# Patient Record
Sex: FEMALE | Race: WHITE | NOT HISPANIC OR LATINO | Employment: FULL TIME | URBAN - METROPOLITAN AREA
[De-identification: names, ages, dates, MRNs, and addresses within clinical notes are randomized per-mention and may not be internally consistent; named-entity substitution may affect disease eponyms.]

---

## 2015-03-24 LAB
HPV_EXT - HISTORICAL: NORMAL
PAP SMEAR - HIM PATIENT REPORTED: NORMAL

## 2017-03-03 ENCOUNTER — COMMUNICATION - HEALTHEAST (OUTPATIENT)
Dept: INTERNAL MEDICINE | Facility: CLINIC | Age: 37
End: 2017-03-03

## 2017-03-03 DIAGNOSIS — F32.A DEPRESSIVE DISORDER: ICD-10-CM

## 2017-03-03 DIAGNOSIS — F41.1 ANXIETY STATE: ICD-10-CM

## 2017-03-08 ENCOUNTER — COMMUNICATION - HEALTHEAST (OUTPATIENT)
Dept: INTERNAL MEDICINE | Facility: CLINIC | Age: 37
End: 2017-03-08

## 2017-03-08 DIAGNOSIS — F41.1 ANXIETY STATE: ICD-10-CM

## 2017-03-08 DIAGNOSIS — F32.A DEPRESSIVE DISORDER: ICD-10-CM

## 2017-07-12 ENCOUNTER — OFFICE VISIT - HEALTHEAST (OUTPATIENT)
Dept: INTERNAL MEDICINE | Facility: CLINIC | Age: 37
End: 2017-07-12

## 2017-07-12 DIAGNOSIS — Z00.00 ROUTINE GENERAL MEDICAL EXAMINATION AT A HEALTH CARE FACILITY: ICD-10-CM

## 2017-07-12 DIAGNOSIS — F32.A DEPRESSIVE DISORDER: ICD-10-CM

## 2017-07-12 DIAGNOSIS — F41.1 ANXIETY STATE: ICD-10-CM

## 2017-07-12 DIAGNOSIS — R06.02 SOB (SHORTNESS OF BREATH): ICD-10-CM

## 2017-07-12 ASSESSMENT — MIFFLIN-ST. JEOR: SCORE: 1421.32

## 2017-08-14 ENCOUNTER — OFFICE VISIT - HEALTHEAST (OUTPATIENT)
Dept: INTERNAL MEDICINE | Facility: CLINIC | Age: 37
End: 2017-08-14

## 2017-08-14 DIAGNOSIS — F32.89 DEPRESSIVE DISORDER, NOT ELSEWHERE CLASSIFIED: ICD-10-CM

## 2017-08-14 ASSESSMENT — MIFFLIN-ST. JEOR: SCORE: 1430.39

## 2017-09-06 ENCOUNTER — COMMUNICATION - HEALTHEAST (OUTPATIENT)
Dept: INTERNAL MEDICINE | Facility: CLINIC | Age: 37
End: 2017-09-06

## 2018-02-08 ENCOUNTER — COMMUNICATION - HEALTHEAST (OUTPATIENT)
Dept: INTERNAL MEDICINE | Facility: CLINIC | Age: 38
End: 2018-02-08

## 2018-02-26 ENCOUNTER — AMBULATORY - HEALTHEAST (OUTPATIENT)
Dept: NURSING | Facility: CLINIC | Age: 38
End: 2018-02-26

## 2018-03-13 ENCOUNTER — RECORDS - HEALTHEAST (OUTPATIENT)
Dept: ADMINISTRATIVE | Facility: OTHER | Age: 38
End: 2018-03-13

## 2018-06-20 ENCOUNTER — COMMUNICATION - HEALTHEAST (OUTPATIENT)
Dept: INTERNAL MEDICINE | Facility: CLINIC | Age: 38
End: 2018-06-20

## 2018-07-19 ENCOUNTER — OFFICE VISIT - HEALTHEAST (OUTPATIENT)
Dept: INTERNAL MEDICINE | Facility: CLINIC | Age: 38
End: 2018-07-19

## 2018-07-19 DIAGNOSIS — N92.0 MENORRHAGIA: ICD-10-CM

## 2018-07-19 DIAGNOSIS — F32.9 MAJOR DEPRESSIVE DISORDER: ICD-10-CM

## 2018-07-19 DIAGNOSIS — Z00.00 ROUTINE GENERAL MEDICAL EXAMINATION AT A HEALTH CARE FACILITY: ICD-10-CM

## 2018-07-19 ASSESSMENT — MIFFLIN-ST. JEOR: SCORE: 1325.49

## 2018-08-06 ENCOUNTER — AMBULATORY - HEALTHEAST (OUTPATIENT)
Dept: LAB | Facility: CLINIC | Age: 38
End: 2018-08-06

## 2018-08-06 DIAGNOSIS — N92.0 MENORRHAGIA: ICD-10-CM

## 2018-08-06 LAB
ERYTHROCYTE [DISTWIDTH] IN BLOOD BY AUTOMATED COUNT: 11.2 % (ref 11–14.5)
FERRITIN SERPL-MCNC: 68 NG/ML (ref 10–130)
HCT VFR BLD AUTO: 43.9 % (ref 35–47)
HGB BLD-MCNC: 14.5 G/DL (ref 12–16)
IRON SATN MFR SERPL: 61 % (ref 20–50)
IRON SERPL-MCNC: 141 UG/DL (ref 42–175)
MCH RBC QN AUTO: 30.6 PG (ref 27–34)
MCHC RBC AUTO-ENTMCNC: 33.1 G/DL (ref 32–36)
MCV RBC AUTO: 92 FL (ref 80–100)
PLATELET # BLD AUTO: 154 THOU/UL (ref 140–440)
PMV BLD AUTO: 8.6 FL (ref 7–10)
RBC # BLD AUTO: 4.75 MILL/UL (ref 3.8–5.4)
TIBC SERPL-MCNC: 231 UG/DL (ref 313–563)
TRANSFERRIN SERPL-MCNC: 185 MG/DL (ref 212–360)
WBC: 4.9 THOU/UL (ref 4–11)

## 2019-01-21 ENCOUNTER — COMMUNICATION - HEALTHEAST (OUTPATIENT)
Dept: INTERNAL MEDICINE | Facility: CLINIC | Age: 39
End: 2019-01-21

## 2019-01-22 ENCOUNTER — COMMUNICATION - HEALTHEAST (OUTPATIENT)
Dept: INTERNAL MEDICINE | Facility: CLINIC | Age: 39
End: 2019-01-22

## 2019-02-25 ENCOUNTER — COMMUNICATION - HEALTHEAST (OUTPATIENT)
Dept: INTERNAL MEDICINE | Facility: CLINIC | Age: 39
End: 2019-02-25

## 2019-03-05 ENCOUNTER — COMMUNICATION - HEALTHEAST (OUTPATIENT)
Dept: INTERNAL MEDICINE | Facility: CLINIC | Age: 39
End: 2019-03-05

## 2019-03-28 ENCOUNTER — OFFICE VISIT - HEALTHEAST (OUTPATIENT)
Dept: INTERNAL MEDICINE | Facility: CLINIC | Age: 39
End: 2019-03-28

## 2019-03-28 DIAGNOSIS — F41.1 ANXIETY STATE: ICD-10-CM

## 2019-04-07 ENCOUNTER — RECORDS - HEALTHEAST (OUTPATIENT)
Dept: ADMINISTRATIVE | Facility: OTHER | Age: 39
End: 2019-04-07

## 2019-04-09 ENCOUNTER — COMMUNICATION - HEALTHEAST (OUTPATIENT)
Dept: INTERNAL MEDICINE | Facility: CLINIC | Age: 39
End: 2019-04-09

## 2019-04-13 ENCOUNTER — RECORDS - HEALTHEAST (OUTPATIENT)
Dept: ADMINISTRATIVE | Facility: OTHER | Age: 39
End: 2019-04-13

## 2019-04-15 ENCOUNTER — COMMUNICATION - HEALTHEAST (OUTPATIENT)
Dept: INTERNAL MEDICINE | Facility: CLINIC | Age: 39
End: 2019-04-15

## 2019-04-23 ENCOUNTER — COMMUNICATION - HEALTHEAST (OUTPATIENT)
Dept: INTERNAL MEDICINE | Facility: CLINIC | Age: 39
End: 2019-04-23

## 2019-04-23 ENCOUNTER — AMBULATORY - HEALTHEAST (OUTPATIENT)
Dept: INTERNAL MEDICINE | Facility: CLINIC | Age: 39
End: 2019-04-23

## 2019-04-23 ENCOUNTER — OFFICE VISIT - HEALTHEAST (OUTPATIENT)
Dept: INTERNAL MEDICINE | Facility: CLINIC | Age: 39
End: 2019-04-23

## 2019-04-23 DIAGNOSIS — F41.1 ANXIETY STATE: ICD-10-CM

## 2019-04-30 ENCOUNTER — COMMUNICATION - HEALTHEAST (OUTPATIENT)
Dept: INTERNAL MEDICINE | Facility: CLINIC | Age: 39
End: 2019-04-30

## 2019-06-12 ENCOUNTER — COMMUNICATION - HEALTHEAST (OUTPATIENT)
Dept: INTERNAL MEDICINE | Facility: CLINIC | Age: 39
End: 2019-06-12

## 2019-07-09 ENCOUNTER — OFFICE VISIT - HEALTHEAST (OUTPATIENT)
Dept: INTERNAL MEDICINE | Facility: CLINIC | Age: 39
End: 2019-07-09

## 2019-08-19 ENCOUNTER — HOSPITAL ENCOUNTER (OUTPATIENT)
Dept: MAMMOGRAPHY | Facility: CLINIC | Age: 39
Discharge: HOME | End: 2019-08-19
Attending: INTERNAL MEDICINE

## 2019-08-19 DIAGNOSIS — Z12.31 SCREENING MAMMOGRAM, ENCOUNTER FOR: ICD-10-CM

## 2019-08-29 ENCOUNTER — RECORDS - HEALTHEAST (OUTPATIENT)
Dept: HEALTH INFORMATION MANAGEMENT | Facility: CLINIC | Age: 39
End: 2019-08-29

## 2019-10-11 ENCOUNTER — COMMUNICATION - HEALTHEAST (OUTPATIENT)
Dept: INTERNAL MEDICINE | Facility: CLINIC | Age: 39
End: 2019-10-11

## 2019-10-11 DIAGNOSIS — F41.1 ANXIETY STATE: ICD-10-CM

## 2019-12-03 ENCOUNTER — RECORDS - HEALTHEAST (OUTPATIENT)
Dept: ADMINISTRATIVE | Facility: OTHER | Age: 39
End: 2019-12-03

## 2020-01-15 ENCOUNTER — COMMUNICATION - HEALTHEAST (OUTPATIENT)
Dept: INTERNAL MEDICINE | Facility: CLINIC | Age: 40
End: 2020-01-15

## 2020-01-15 DIAGNOSIS — F41.1 ANXIETY STATE: ICD-10-CM

## 2020-03-03 ENCOUNTER — AMBULATORY - HEALTHEAST (OUTPATIENT)
Dept: NURSING | Facility: CLINIC | Age: 40
End: 2020-03-03

## 2020-04-02 ENCOUNTER — COMMUNICATION - HEALTHEAST (OUTPATIENT)
Dept: INTERNAL MEDICINE | Facility: CLINIC | Age: 40
End: 2020-04-02

## 2020-04-02 DIAGNOSIS — F41.1 ANXIETY STATE: ICD-10-CM

## 2020-09-02 ENCOUNTER — HOSPITAL ENCOUNTER (OUTPATIENT)
Dept: MAMMOGRAPHY | Facility: CLINIC | Age: 40
Discharge: HOME | End: 2020-09-02
Attending: OBSTETRICS & GYNECOLOGY

## 2020-09-02 DIAGNOSIS — Z12.31 VISIT FOR SCREENING MAMMOGRAM: ICD-10-CM

## 2021-04-13 ENCOUNTER — COMMUNICATION - HEALTHEAST (OUTPATIENT)
Dept: INTERNAL MEDICINE | Facility: CLINIC | Age: 41
End: 2021-04-13

## 2021-04-13 DIAGNOSIS — F41.1 ANXIETY STATE: ICD-10-CM

## 2021-04-13 RX ORDER — PAROXETINE 20 MG/1
TABLET, FILM COATED ORAL
Qty: 90 TABLET | Refills: 0 | Status: SHIPPED | OUTPATIENT
Start: 2021-04-13 | End: 2021-07-10

## 2021-04-22 ENCOUNTER — COMMUNICATION - HEALTHEAST (OUTPATIENT)
Dept: INTERNAL MEDICINE | Facility: CLINIC | Age: 41
End: 2021-04-22

## 2021-04-22 DIAGNOSIS — F41.1 ANXIETY STATE: ICD-10-CM

## 2021-04-28 ENCOUNTER — AMBULATORY - HEALTHEAST (OUTPATIENT)
Dept: NURSING | Facility: CLINIC | Age: 41
End: 2021-04-28

## 2021-05-03 ENCOUNTER — OFFICE VISIT - HEALTHEAST (OUTPATIENT)
Dept: INTERNAL MEDICINE | Facility: CLINIC | Age: 41
End: 2021-05-03

## 2021-05-03 DIAGNOSIS — M25.511 RIGHT SHOULDER PAIN, UNSPECIFIED CHRONICITY: ICD-10-CM

## 2021-05-03 DIAGNOSIS — Z00.00 ROUTINE GENERAL MEDICAL EXAMINATION AT A HEALTH CARE FACILITY: ICD-10-CM

## 2021-05-03 LAB
CHOLEST SERPL-MCNC: 148 MG/DL
FASTING STATUS PATIENT QL REPORTED: NO
FASTING STATUS PATIENT QL REPORTED: NO
GLUCOSE BLD-MCNC: 83 MG/DL (ref 70–125)
HDLC SERPL-MCNC: 54 MG/DL
LDLC SERPL CALC-MCNC: 81 MG/DL
TRIGL SERPL-MCNC: 67 MG/DL

## 2021-05-03 ASSESSMENT — MIFFLIN-ST. JEOR: SCORE: 1361.78

## 2021-05-19 ENCOUNTER — AMBULATORY - HEALTHEAST (OUTPATIENT)
Dept: NURSING | Facility: CLINIC | Age: 41
End: 2021-05-19

## 2021-05-27 VITALS
HEART RATE: 76 BPM | WEIGHT: 146 LBS | SYSTOLIC BLOOD PRESSURE: 98 MMHG | DIASTOLIC BLOOD PRESSURE: 60 MMHG | HEIGHT: 68 IN | BODY MASS INDEX: 22.13 KG/M2

## 2021-05-27 NOTE — PROGRESS NOTES
Dionne comes in today for follow-up of her anxiety.  She is currently taking 40 mg of Prozac which she has been on for a number of years.  Approximately 1-2 years ago we tried to switch her to Effexor but she did not tolerate this well at all and in fact had some suicidal thoughts while on it.  Unfortunately over the last few months she sitting panic symptoms and has had to take her Ativan more frequently to the point where she is taking almost daily.  She will get some shortness of breath as well as a sensation that the room is closing in on her.  A lot of the visit was spent in counseling today empathizing with her feelings.  She is inquiring about cytochrome P4 50 testing and whether it would be worthwhile to do so.  I told her that this could potentially give us some good information regarding her medications and I would support her if she chose to do it.  She is otherwise feeling well.    Objective: Vital signs are as per the EMR.  In general the patient is alert pleasant and in no acute distress.  She appears healthy.    Assessment and plan:    1.  Anxiety, uncontrolled.  I told her that I would favor adding BuSpar to her regimen rather than changing medications given her previous experience with changing medications.  She is going to look up more info on BuSpar and get back to me if she wants to start it.  She is very interested in genetic testing which apparently is something that can be ordered through a lab company independently.  Again, she is going to get back to me if she wants to change her medication regimen.    40 minutes were spent with the patient, over half of which was in counseling coordination of care.

## 2021-05-27 NOTE — TELEPHONE ENCOUNTER
Left detailed message for patient that mychart message was received and to give a couple days for a response.  Анна Moreno CMA ............... 4:22 PM, 04/15/19

## 2021-05-27 NOTE — TELEPHONE ENCOUNTER
Who is calling:  Patient   Reason for Call:  Calling for follow up request to My Chart messages. Patient states it is not of urgency but would like to confirm delivery .  Date of last appointment with primary care: 03/28/19  Okay to leave a detailed message: Yes

## 2021-05-28 NOTE — PROGRESS NOTES
"Sangita Bowling is a 38 y.o. female who is being evaluated via a billable telephone visit.      The patient has been notified of following:     \"This telephone visit will be conducted via a call between you and your physician/provider. We have found that certain health care needs can be provided without the need for a physical exam.  This service lets us provide the care you need with a short phone conversation.  If a prescription is necessary we can send it directly to your pharmacy.  If lab work is needed we can place an order for that and you can then stop by our lab to have the test done at a later time.    If during the course of the call the physician/provider feels a telephone visit is not appropriate, you will not be charged for this service.\"     Consent has been obtained for this service by 1 care team member: Yes. See the scanned image in the medical record.    Sangita Bowling complains of Follow-up (new medication )  .    I have reviewed and updated the patient's Past Medical History, Social History, Family History and Medication List.    ALLERGIES  Patient has no known allergies.    Petra Maurice (MA signature)    Additional provider notes:Depression or Anxiety - New Diagnosis   Duration of complaint: Years   Abnormal Mood Symptoms      Duration: Years    Description:  Depression: no  Anxiety: yes  Panic attacks: yes     Accompanying signs and symptoms: see PHQ-9 and DEWEY scores    History (similar episodes/previous evaluation): Dionne has been on Prozac for a number of years but feels that it is not working well for her.  She has been having some more panic attacks.  She had genetic testing which is scanned into the chart.  She is a moderate metabolizer of Prozac but has minimal gene drug interactions with both Paxil and BuSpar.    Precipitating or alleviating factors: None    Therapies tried and outcome: Effexor XR (Venafaxine), significant side effects      Assessment/Plan:  Anxiety.  Given her " genetic testing I am going to start her on Paxil at 20 mg/day.  Side effects were reviewed.  We will have another virtual visit in approximately 3 to 4 weeks.  If her symptoms are not well controlled at that time while we may increase her Paxil or start her on BuSpar as well.    Follow Up Plan: Follow up in 4 week(s) for follow-up.  I have reviewed the note as documented above.  This accurately captures the substance of my conversation with the patient.    Total time of call between patient and provider was 15 minutes     Petra Maurice MA /BIA

## 2021-05-28 NOTE — TELEPHONE ENCOUNTER
Who is calling:  The patient  Reason for Call:  The patient would like to know if starting on a lower dose of Paxil than what was originally dicussed at virtual visit would be of would be effective.   Date of last appointment with primary care: 4/23/2019  Okay to leave a detailed message: No

## 2021-05-28 NOTE — TELEPHONE ENCOUNTER
Yes, I am going to be starting her on a relatively low dose of Paxil to make sure she tolerates it well

## 2021-05-28 NOTE — TELEPHONE ENCOUNTER
Left voicemail for patient to return call to clinic. When patient returns call, please give them below message.    Анна Moreno CMA ............... 2:36 PM, 04/23/19

## 2021-05-28 NOTE — TELEPHONE ENCOUNTER
Patient Returning Call  Reason for call:  Medication question   Information relayed to patient:  Yes, I am going to be starting her on a relatively low dose of Paxil to make sure she tolerates it well.  Patient has additional questions:  Patient verbalized understanding and had no additional questions. She was advised to call the clinic if she experiences any adverse effects of this medication.   If YES, what are your questions/concerns:  N/A  Okay to leave a detailed message?: No call back needed

## 2021-05-31 VITALS — WEIGHT: 160 LBS | BODY MASS INDEX: 24.25 KG/M2 | HEIGHT: 68 IN

## 2021-05-31 VITALS — HEIGHT: 68 IN | WEIGHT: 162 LBS | BODY MASS INDEX: 24.55 KG/M2

## 2021-06-01 VITALS — BODY MASS INDEX: 20.92 KG/M2 | WEIGHT: 138 LBS | HEIGHT: 68 IN

## 2021-06-02 ENCOUNTER — RECORDS - HEALTHEAST (OUTPATIENT)
Dept: ADMINISTRATIVE | Facility: CLINIC | Age: 41
End: 2021-06-02

## 2021-06-02 VITALS — BODY MASS INDEX: 20.37 KG/M2 | WEIGHT: 133 LBS

## 2021-06-02 NOTE — TELEPHONE ENCOUNTER
Refill Approved    Rx renewed per Medication Renewal Policy. Medication was last renewed on 4/23/2019 with 1 refill.  Last office visit: 3/28/2019 with PCP Dr ANDRAE Rangel, [facility] Triage/Med Refill 10/12/2019     Requested Prescriptions   Pending Prescriptions Disp Refills     PARoxetine (PAXIL) 20 MG tablet [Pharmacy Med Name: PAROXETINE 20MG     TAB] 90 tablet 1     Sig: TAKE 1 TABLET BY MOUTH ONCE DAILY IN THE MORNING       [facility] Refill Protocol  Passed - 10/11/2019  9:42 AM        Passed - PCP or prescribing provider visit in last year     Last office visit with prescriber/PCP: 3/28/2019 Armando Lowe MD OR same dept: 3/28/2019 Armando Lowe MD OR same specialty: 3/28/2019 Armando Lowe MD  Last physical: 7/19/2018 Last MTM visit: Visit date not found   Next visit within 3 mo: Visit date not found  Next physical within 3 mo: Visit date not found  Prescriber OR PCP: Armando Lowe MD  Last diagnosis associated with med order: 1. Anxiety  - PARoxetine (PAXIL) 20 MG tablet [Pharmacy Med Name: PAROXETINE 20MG     TAB]; TAKE 1 TABLET BY MOUTH ONCE DAILY IN THE MORNING  Dispense: 90 tablet; Refill: 1    If protocol passes may refill for 12 months if within 3 months of last provider visit (or a total of 15 months).

## 2021-06-05 NOTE — TELEPHONE ENCOUNTER
Refill Approved    Rx renewed per Medication Renewal Policy. Medication was last renewed on 10/12/19.    Dorothy Michele, [facility] Triage/Med Refill 1/17/2020     Requested Prescriptions   Pending Prescriptions Disp Refills     PARoxetine (PAXIL) 20 MG tablet [Pharmacy Med Name: PARoxetine HCl 20 MG Oral Tablet] 90 tablet 0     Sig: TAKE 1 TABLET BY MOUTH ONCE DAILY IN THE MORNING       [facility] Refill Protocol  Passed - 1/15/2020  6:54 PM        Passed - PCP or prescribing provider visit in last year     Last office visit with prescriber/PCP: 3/28/2019 Armando Lowe MD OR same dept: 3/28/2019 Armando Lowe MD OR same specialty: 3/28/2019 Armando Lowe MD  Last physical: 7/19/2018 Last MTM visit: Visit date not found   Next visit within 3 mo: Visit date not found  Next physical within 3 mo: Visit date not found  Prescriber OR PCP: Armando Lowe MD  Last diagnosis associated with med order: 1. Anxiety  - PARoxetine (PAXIL) 20 MG tablet [Pharmacy Med Name: PARoxetine HCl 20 MG Oral Tablet]; TAKE 1 TABLET BY MOUTH ONCE DAILY IN THE MORNING  Dispense: 90 tablet; Refill: 0    If protocol passes may refill for 12 months if within 3 months of last provider visit (or a total of 15 months).

## 2021-06-07 NOTE — TELEPHONE ENCOUNTER
RN cannot approve Refill Request    RN can NOT refill this medication Protocol failed and NO refill given.     Dorothy Michele, [facility] Triage/Med Refill 4/3/2020    Requested Prescriptions   Pending Prescriptions Disp Refills     PARoxetine (PAXIL) 20 MG tablet [Pharmacy Med Name: PARoxetine HCl 20 MG Oral Tablet] 90 tablet 3     Sig: TAKE 1 TABLET BY MOUTH ONCE DAILY IN THE MORNING       [facility] Refill Protocol  Failed - 4/2/2020  8:27 AM        Failed - PCP or prescribing provider visit in last year     Last office visit with prescriber/PCP: 3/28/2019 Armando Lowe MD OR same dept: Visit date not found OR same specialty: 3/28/2019 Armando Lowe MD  Last physical: 7/19/2018 Last MTM visit: Visit date not found   Next visit within 3 mo: Visit date not found  Next physical within 3 mo: Visit date not found  Prescriber OR PCP: Armando Lowe MD  Last diagnosis associated with med order: 1. Anxiety  - PARoxetine (PAXIL) 20 MG tablet [Pharmacy Med Name: PARoxetine HCl 20 MG Oral Tablet]; TAKE 1 TABLET BY MOUTH ONCE DAILY IN THE MORNING  Dispense: 90 tablet; Refill: 0    If protocol passes may refill for 12 months if within 3 months of last provider visit (or a total of 15 months).

## 2021-06-11 NOTE — PROGRESS NOTES
"Sangita comes in today for her yearly physical.  Please see the physical exam forms a and B for further details, including a complete review of systems.    ILNESSES, HOSPITALIZATIONS, AND OPERATIONS:     #1.  History of depression and anxiety, currently on Prozac.     #2.  Status post  ×2.    Sangita states that she has been more irritable over the last few months and she has not been sleeping as well.  She has been on Prozac for about the last 22 years and on the same dose for the majority of that time period.  Appetite has not changed and her ability to concentrate is the same.  Energy level is a bit decreased as well.  She also states that she is more short of breath recently, specifically while going upstairs.  However this is not significantly interfering with any of her day-to-day activities.  She states this is been an issue for the last couple of years and she states that she feels that she is not able to get a \"good deep breath\".  She denies any blood in her stool or black tarry stools.  No menorrhagia.    OBJECTIVE:    In general the patient is alert pleasant and in no acute distress.    HEENT: Pupils equal round reactive light.  Oropharynx is clear.  Lymphatic shows no anterior posterior cervical lymphadenopathy.  No thyromegaly or other masses noted.    Cardiovascular: S1-S2 regular in rhythm no murmurs gallops rubs    Lungs are clear    Abdomen is soft nontender nondistended.  No HSM.    Extremities show no pedal edema present bilaterally.  DP pulses are 2+ and normal bilaterally.    Assessment and plan: Physical exam along with the followin.  Depression, worsening.  I explained that people can adapt to the SSRIs, especially being on them for such a long period of time.  I recommended that we switch to an SNRI which she is okay with doing.  We will start her out on Effexor at 75 mg per day.  Side effects were reviewed.  She will follow-up in 1 month.  2.  Shortness of breath.  Check a CBC, " CMP, TSH, sed rate, and CRP.  I will call her with results of further recommendations.  3.  Healthcare maintenance.  Pap is up-to-date.  Vaccinations are up-to-date.  We will discuss mammograms next year.  Follow-up 1 year, earlier if needed.

## 2021-06-12 NOTE — PROGRESS NOTES
Adia comes in today for follow-up of her anxiety.  At her last visit about a month ago we started her on Effexor as she thought that her Prozac was not working as well for her.  Unfortunately she has had a number of side effects from the Effexor including hot flashes, jitteriness, and sweating.  She also states that it is not controlling her anxiety as much as the Prozac was.  She actually had to take 1 of her Ativan a few nights ago which is a very rare occurrence for her.  We discussed the pros and cons of switching to other medications versus coming off of medications completely today.    Objective: Vital signs are as per the EMR.  In general the patient is alert pleasant and in no acute distress.  She appears healthy.    Assessment and plan: Anxiety, a bit worsened since going on Effexor.  Again we discussed the pros and cons of her coming off of medication completely.  She would like to try this but she wants to wean off of her Effexor prior to doing so.  I sent her in a prescription for 14 days of 37.5 mg capsules.  She is going to wean off of the Effexor and then take 2-3 weeks for things to re-equilibrate.  If her anxiety worsens at that time and she wants a different medication she can send me a message or give me a call and we could consider either Celexa or Lexapro for her.  Otherwise follow-up as needed.    25 minutes were spent with the patient, over half of which was in counseling and coordination of care.

## 2021-06-16 NOTE — TELEPHONE ENCOUNTER
Controlled Substance Refill Request  Medication Name:   Requested Prescriptions     Pending Prescriptions Disp Refills     LORazepam (ATIVAN) 1 MG tablet [Pharmacy Med Name: LORAZEPAM 1MG TABS] 30 tablet 0     Sig: TAKE ONE TABLET BY MOUTH EVERY 6 HOURS AS NEEDED FOR ANXIETY     Date Last Fill: 1/22/19  [facility]ed [facility]  Submit electronically to pharmacy  Controlled Substance Agreement on file:   Encounter-Level CSA Scan Date:    There are no encounter-level csa scan date.        Last office visit:  7/9/19

## 2021-06-17 NOTE — PROGRESS NOTES
Assessment:      Healthy female exam.      Anxiety, well controlled.     Plan:     Check a lipid profile and glucose.  She gets her mammograms, Paps, and annual breast exams through her gynecologist (Dr. Kruse).  Vaccinations are up-to-date.  Follow-up 2 years, earlier if needed.    Subjective:      Sangita Bowling is a 40 y.o. female who presents for an annual exam.  She is feeling well today and has no acute complaints.  She has a history of anxiety which is well controlled on Paxil.  She is looking to get a referral for physical therapy for some low-level right shoulder pain today which was provided.      Immunization History   Administered Date(s) Administered     COVID-19,PF,Pfizer 2021     Hep A, historic 2010     INFLUENZA,SEASONAL QUAD, PF, =/> 6months 2020     Influenza, inj, historic,unspecified 2014, 10/12/2020     Influenza,seasonal quad, PF 2013     Influenza,seasonal,quad inj =/> 6months 10/08/2015, 2016, 2018     Td,adult,historic,unspecified 2009     Tdap 2009, 2015     Immunization status: up to date and documented.    No exam data present    OB History    Para Term  AB Living   3 2 2     1   SAB TAB Ectopic Multiple Live Births           1      # Outcome Date GA Lbr Roni/2nd Weight Sex Delivery Anes PTL Lv   3 Term 13   6 lb 4 oz (2.835 kg) M CS-LTranv  N SHARON   2 Term            1                Birth Comments: System Generated. Please review and update pregnancy details.       Current Outpatient Medications   Medication Sig Dispense Refill     L.acid/B.bifidum/B.animal/FOS (PROBIOTIC COMPLEX ORAL) Take by mouth.       PARoxetine (PAXIL) 20 MG tablet TAKE 1 TABLET BY MOUTH ONCE DAILY IN THE MORNING 90 tablet 0     No current facility-administered medications for this visit.      No past medical history on file.  Past Surgical History:   Procedure Laterality Date      SECTION, LOW TRANSVERSE       LASIK  Bilateral      IN  DELIVERY ONLY      Description:  Section;  Recorded: 2013;     IN FRAGMENT KIDNEY STONE/ ESWL      Description: Lithotripsy;  Recorded: 2009;  Comments: x2     Venlafaxine  Family History   Problem Relation Age of Onset     Hypertension Father      Alzheimer's disease Father      Hyperlipidemia Father      Stroke Father      Brain cancer Paternal Grandmother      Stroke Paternal Grandmother      Lung cancer Other      Social History     Socioeconomic History     Marital status:      Spouse name: Not on file     Number of children: Not on file     Years of education: Not on file     Highest education level: Not on file   Occupational History     Not on file   Social Needs     Financial resource strain: Not on file     Food insecurity     Worry: Not on file     Inability: Not on file     Transportation needs     Medical: Not on file     Non-medical: Not on file   Tobacco Use     Smoking status: Former Smoker     Smokeless tobacco: Never Used   Substance and Sexual Activity     Alcohol use: Yes     Alcohol/week: 1.0 standard drinks     Types: 1 Glasses of wine per week     Drug use: No     Sexual activity: Yes     Partners: Male     Birth control/protection: None   Lifestyle     Physical activity     Days per week: Not on file     Minutes per session: Not on file     Stress: Not on file   Relationships     Social connections     Talks on phone: Not on file     Gets together: Not on file     Attends Rastafari service: Not on file     Active member of club or organization: Not on file     Attends meetings of clubs or organizations: Not on file     Relationship status: Not on file     Intimate partner violence     Fear of current or ex partner: Not on file     Emotionally abused: Not on file     Physically abused: Not on file     Forced sexual activity: Not on file   Other Topics Concern     Not on file   Social History Narrative     Not on file       Review of  "Systems  General:  Denies problem  Eyes: Denies problem  Ears/Nose/Throat: Denies problem  Cardiovascular: Denies problem  Respiratory:  Denies problem  Gastrointestinal:  Denies problem, Genitourinary: Denies problem  Musculoskeletal:  Denies problem  Skin: Denies problem  Neurologic: Denies problem  Psychiatric: Denies problem  Endocrine: Denies problem  Heme/Lymphatic: Denies problem   Allergic/Immunologic: Denies problem        Objective:         Vitals:    05/03/21 1305   BP: 98/60   Pulse: 76   Weight: 146 lb (66.2 kg)   Height: 5' 7.75\" (1.721 m)     Body mass index is 22.36 kg/m .    Physical Exam:  General Appearance: Alert, cooperative, no distress, appears stated age  Head: Normocephalic, without obvious abnormality, atraumatic  Eyes: PERRL, conjunctiva/corneas clear, EOM's intact  Ears: Normal TM's and external ear canals, both ears  Nose: Nares normal, septum midline,mucosa normal, no drainage  Throat: Lips, mucosa, and tongue normal; teeth and gums normal  Neck: Supple, symmetrical, trachea midline, no adenopathy;  thyroid: not enlarged, symmetric, no tenderness/mass/nodules; no carotid bruit or JVD  Back: Symmetric, no curvature, ROM normal, no CVA tenderness  Lungs: Clear to auscultation bilaterally, respirations unlabored  Heart: Regular rate and rhythm, S1 and S2 normal, no murmur, rub, or gallop, Abdomen: Soft, non-tender, bowel sounds active all four quadrants,  no masses, no organomegaly  Extremities: Extremities normal, atraumatic, no cyanosis or edema  Skin: Skin color, texture, turgor normal, no rashes or lesions  Lymph nodes: Cervical, supraclavicular, and axillary nodes normal  Neurologic: Normal        "

## 2021-06-19 NOTE — PROGRESS NOTES
"Sangita comes in today for her yearly physical exam.  A complete ROS was undertaken and was negative unless noted below.    ILLNESSES, HOSPITALIZATIONS, AND OPERATIONS:    #1.  Depression, well controlled on Prozac.    Dionne feels well today and has no acute complaints.  She has been working hard on weight loss and has lost about 20 pounds since last time I saw her.  She states that she feels \"as good as I have ever felt\".  Things are going well with her kids, ages 3 and 5.  She does say that she has had fairly heavy periods since March of this year.  She has noticed that she gets more fatigued during menstruation.  She has not had this evaluated yet.    OBJECTIVE:    In general the patient is alert pleasant and in no acute distress.    HEENT: Pupils equal round reactive light.  Oropharynx is clear.  Lymphatic shows no anterior posterior cervical lymphadenopathy.  No thyromegaly or other masses noted.    Cardiovascular: S1-S2 regular in rhythm no murmurs gallops rubs    Lungs are clear    Abdomen is soft nontender nondistended.  No HSM.    Extremities show no pedal edema present bilaterally.  DP pulses are 2+ and normal bilaterally.    Assessment and plan:    1.  Menorrhagia.  Check a ITS, ferritin, and CBC.  We discussed treatment of this including thinking about OCPs and potentially referral to gynecology for consideration of an endometrial ablation.  2. Depression, well controlled.  3.  Healthcare maintenance.  Labs up-to-date, vaccinations up-to-date.  Follow-up 2 years, earlier if needed.  "

## 2021-06-20 ENCOUNTER — HEALTH MAINTENANCE LETTER (OUTPATIENT)
Age: 41
End: 2021-06-20

## 2021-06-23 NOTE — TELEPHONE ENCOUNTER
Medication Request  Medication name:   LORazepam (ATIVAN) 1 MG tablet (Discontinued) 30 tablet 0 8/4/2016 8/17/2016 No   Sig - Route: Take 1 tablet (1 mg total) by mouth every 8 (eight) hours as needed for anxiety. - Oral     Pharmacy Name and Location: [facility]  Reason for request: patient states she has been experiencing increased anxiety.  When did you use medication last?: Unknown  Okay to leave a detailed message: yes

## 2021-06-23 NOTE — TELEPHONE ENCOUNTER
Medication Question or Clarification  Who is calling: Patient  What medication are you calling about?: LORazepam (ATIVAN) 1 MG tablet  What dose do you take?: see below Rx  How often are you taking the medication?: see below Rx  Who prescribed the medication?: pcp  What is your question/concern?: Patient reports this medication is not available at the below pharmacy. Please send new prescription to [facility] in [place]. Pharmacy cannot transfer due to the medication being a controlled substance.  Pharmacy: [place] [id number] [place] [facility] [place] - [address] [place] to leave a detailed message?: Yes  Site CMT - Please call the pharmacy to obtain any additional needed information.    LORazepam (ATIVAN) 1 MG tablet   Medication   Date: 1/21/2019 Department: [facility] Internal Medicine Ordering/Authorizing: Aramndo Lowe MD   Order Providers     Prescribing Provider Encounter Provider   Armando Lowe MD Johnson, Greg Michael, MD   Medication Detail      Disp Refills Start End    LORazepam (ATIVAN) 1 MG tablet 30 tablet 0 1/21/2019     Sig - Route: Take 1 tablet (1 mg total) by mouth every 6 (six) hours as needed for anxiety. - Oral    Sent to pharmacy as: LORazepam (ATIVAN) 1 MG tablet    E-Prescribing Status: Receipt confirmed by pharmacy (1/21/2019 12:03 PM CST)    Pharmacy     [place] [id number] [place] [facility] [place] - [address]

## 2021-07-10 ENCOUNTER — COMMUNICATION - HEALTHEAST (OUTPATIENT)
Dept: INTERNAL MEDICINE | Facility: CLINIC | Age: 41
End: 2021-07-10

## 2021-07-10 DIAGNOSIS — F41.1 ANXIETY STATE: ICD-10-CM

## 2021-07-10 RX ORDER — PAROXETINE 20 MG/1
TABLET, FILM COATED ORAL
Qty: 90 TABLET | Refills: 3 | Status: SHIPPED | OUTPATIENT
Start: 2021-07-10 | End: 2022-07-21

## 2021-07-10 NOTE — TELEPHONE ENCOUNTER
Telephone Encounter by Liza Fu RN at 7/10/2021  9:32 AM     Author: Liza Fu RN Service: -- Author Type: Registered Nurse    Filed: 7/10/2021  9:34 AM Encounter Date: 7/10/2021 Status: Signed    : Liza Fu RN (Registered Nurse)       Refill Approved    Rx renewed per Medication Renewal Policy. Medication was last renewed on 4/13/21.    Liza Fu [facility] Triage/Med Refill 7/10/2021     Requested Prescriptions   Pending Prescriptions Disp Refills   ? PARoxetine (PAXIL) 20 MG tablet [Pharmacy Med Name: PARoxetine HCl 20 MG Oral Tablet] 90 tablet 0     Sig: TAKE 1 TABLET BY MOUTH ONCE DAILY IN THE MORNING       SSRI Refill Protocol  Passed - 7/10/2021  7:39 AM        Passed - PCP or prescribing provider visit in last year     Last office visit with prescriber/PCP: 3/28/2019 Armando Lowe MD OR same dept: Visit date not found OR same specialty: 3/28/2019 Armando Lowe MD  Last physical: 5/3/2021 Last MTM visit: Visit date not found   Next visit within 3 mo: Visit date not found  Next physical within 3 mo: Visit date not found  Prescriber OR PCP: Armando Lowe MD  Last diagnosis associated with med order: 1. Anxiety  - PARoxetine (PAXIL) 20 MG tablet [Pharmacy Med Name: PARoxetine HCl 20 MG Oral Tablet]; TAKE 1 TABLET BY MOUTH ONCE DAILY IN THE MORNING  Dispense: 90 tablet; Refill: 0    If protocol passes may refill for 12 months if within 3 months of last provider visit (or a total of 15 months).

## 2021-10-05 ENCOUNTER — HOSPITAL ENCOUNTER (OUTPATIENT)
Dept: MAMMOGRAPHY | Facility: CLINIC | Age: 41
Discharge: HOME | End: 2021-10-05
Attending: OBSTETRICS & GYNECOLOGY | Admitting: OBSTETRICS & GYNECOLOGY
Payer: COMMERCIAL

## 2021-10-05 DIAGNOSIS — Z12.31 VISIT FOR SCREENING MAMMOGRAM: ICD-10-CM

## 2021-10-05 PROCEDURE — 77067 SCR MAMMO BI INCL CAD: CPT

## 2021-10-11 ENCOUNTER — HEALTH MAINTENANCE LETTER (OUTPATIENT)
Age: 41
End: 2021-10-11

## 2022-07-17 ENCOUNTER — HEALTH MAINTENANCE LETTER (OUTPATIENT)
Age: 42
End: 2022-07-17

## 2022-07-20 DIAGNOSIS — F41.1 ANXIETY STATE: ICD-10-CM

## 2022-07-21 RX ORDER — PAROXETINE 20 MG/1
TABLET, FILM COATED ORAL
Qty: 90 TABLET | Refills: 0 | Status: SHIPPED | OUTPATIENT
Start: 2022-07-21 | End: 2022-09-16

## 2022-07-21 NOTE — TELEPHONE ENCOUNTER
"Routing refill request to provider for review/approval because:  Patient needs to be seen because it has been more than 1 year since last office visit.    Last Written Prescription Date:  7/10/21  Last Fill Quantity: 90,  # refills: 3   Last office visit provider:  5/3/21     Requested Prescriptions   Pending Prescriptions Disp Refills     PARoxetine (PAXIL) 20 MG tablet [Pharmacy Med Name: PARoxetine HCl 20 MG Oral Tablet] 90 tablet 0     Sig: TAKE 1 TABLET BY MOUTH ONCE DAILY IN THE MORNING       SSRIs Protocol Failed - 7/20/2022  2:28 PM        Failed - Recent (12 mo) or future (30 days) visit within the authorizing provider's specialty     Patient has had an office visit with the authorizing provider or a provider within the authorizing providers department within the previous 12 mos or has a future within next 30 days. See \"Patient Info\" tab in inbasket, or \"Choose Columns\" in Meds & Orders section of the refill encounter.              Passed - Medication is active on med list        Passed - Patient is age 18 or older        Passed - No active pregnancy on record        Passed - No positive pregnancy test in last 12 months             Eloina Watkins RN 07/21/22 1:15 AM  "

## 2022-07-28 ENCOUNTER — OFFICE VISIT (OUTPATIENT)
Dept: INTERNAL MEDICINE | Facility: CLINIC | Age: 42
End: 2022-07-28
Payer: COMMERCIAL

## 2022-07-28 VITALS
OXYGEN SATURATION: 98 % | DIASTOLIC BLOOD PRESSURE: 62 MMHG | SYSTOLIC BLOOD PRESSURE: 98 MMHG | WEIGHT: 147.6 LBS | HEART RATE: 85 BPM | BODY MASS INDEX: 22.61 KG/M2

## 2022-07-28 DIAGNOSIS — F41.1 ANXIETY STATE: Primary | ICD-10-CM

## 2022-07-28 PROCEDURE — 99214 OFFICE O/P EST MOD 30 MIN: CPT | Performed by: INTERNAL MEDICINE

## 2022-07-28 RX ORDER — LORAZEPAM 1 MG/1
1 TABLET ORAL EVERY 6 HOURS PRN
COMMUNITY
End: 2023-08-02

## 2022-07-28 ASSESSMENT — ANXIETY QUESTIONNAIRES
2. NOT BEING ABLE TO STOP OR CONTROL WORRYING: SEVERAL DAYS
5. BEING SO RESTLESS THAT IT IS HARD TO SIT STILL: NOT AT ALL
3. WORRYING TOO MUCH ABOUT DIFFERENT THINGS: SEVERAL DAYS
IF YOU CHECKED OFF ANY PROBLEMS ON THIS QUESTIONNAIRE, HOW DIFFICULT HAVE THESE PROBLEMS MADE IT FOR YOU TO DO YOUR WORK, TAKE CARE OF THINGS AT HOME, OR GET ALONG WITH OTHER PEOPLE: SOMEWHAT DIFFICULT
GAD7 TOTAL SCORE: 4
4. TROUBLE RELAXING: NOT AT ALL
1. FEELING NERVOUS, ANXIOUS, OR ON EDGE: SEVERAL DAYS
7. FEELING AFRAID AS IF SOMETHING AWFUL MIGHT HAPPEN: NOT AT ALL
GAD7 TOTAL SCORE: 4
GAD7 TOTAL SCORE: 4
8. IF YOU CHECKED OFF ANY PROBLEMS, HOW DIFFICULT HAVE THESE MADE IT FOR YOU TO DO YOUR WORK, TAKE CARE OF THINGS AT HOME, OR GET ALONG WITH OTHER PEOPLE?: SOMEWHAT DIFFICULT
6. BECOMING EASILY ANNOYED OR IRRITABLE: SEVERAL DAYS
7. FEELING AFRAID AS IF SOMETHING AWFUL MIGHT HAPPEN: NOT AT ALL

## 2022-07-28 ASSESSMENT — PATIENT HEALTH QUESTIONNAIRE - PHQ9
10. IF YOU CHECKED OFF ANY PROBLEMS, HOW DIFFICULT HAVE THESE PROBLEMS MADE IT FOR YOU TO DO YOUR WORK, TAKE CARE OF THINGS AT HOME, OR GET ALONG WITH OTHER PEOPLE: SOMEWHAT DIFFICULT
SUM OF ALL RESPONSES TO PHQ QUESTIONS 1-9: 6
SUM OF ALL RESPONSES TO PHQ QUESTIONS 1-9: 6

## 2022-07-28 NOTE — PROGRESS NOTES
Assessment & Plan   Problem List Items Addressed This Visit     Anxiety - Primary    Relevant Medications    LORazepam (ATIVAN) 1 MG tablet           Depression and anxiety.  Agree with increasing the dose to 30 mg/day.  She is going to do this for 3 to 4 weeks and she is going to get a hold of me via [employer].  We can decide if she needs further dose increases at that time.  Ativan was refilled today as well.  Follow-up with us as needed in the clinic.      Aramndo Lowe MD  [facility]    Desiree Quesada is a 42-year-old female with a history of depression and anxiety who comes in today for evaluation of worsening symptoms.  She states that things been getting worse over the last couple of months.  She has a lot more stress at home as she is home all summer with her children and her  (who is an ER physicians assistant) has been under a lot more stress at work since the COVID pandemic which she says is showing at home.  She is currently on 20 mg of Paxil per day and states that it had been working, but over the last couple of months it has started to lose efficacy.  She is taking it on a regular basis, no missed doses.  Otherwise doing well.  She has increased her dose to 30 mg/day over the last 3 days on her own.        Objective    BP 98/62 (BP Location: Right arm, Patient Position: Sitting, Cuff Size: Adult Regular)   Pulse 85   Wt 67 kg (147 lb 9.6 oz)   SpO2 98%   BMI 22.61 kg/m    Body mass index is 22.61 kg/m .  Physical Exam               .  ..

## 2022-09-16 DIAGNOSIS — F41.1 ANXIETY STATE: ICD-10-CM

## 2022-09-16 RX ORDER — PAROXETINE 20 MG/1
TABLET, FILM COATED ORAL
Qty: 90 TABLET | Refills: 2 | Status: SHIPPED | OUTPATIENT
Start: 2022-09-16 | End: 2022-09-19

## 2022-09-16 NOTE — TELEPHONE ENCOUNTER
"Last Written Prescription Date:  7/21/22  Last Fill Quantity: 90,  # refills: 0   Last office visit provider:  7/28/22     Requested Prescriptions   Pending Prescriptions Disp Refills     PARoxetine (PAXIL) 20 MG tablet [Pharmacy Med Name: PARoxetine HCl 20 MG Oral Tablet] 90 tablet 0     Sig: TAKE 1 TABLET BY MOUTH ONCE DAILY IN THE MORNING       SSRIs Protocol Passed - 9/16/2022 11:54 AM        Passed - Recent (12 mo) or future (30 days) visit within the authorizing provider's specialty     Patient has had an office visit with the authorizing provider or a provider within the authorizing providers department within the previous 12 mos or has a future within next 30 days. See \"Patient Info\" tab in inbasket, or \"Choose Columns\" in Meds & Orders section of the refill encounter.              Passed - Medication is active on med list        Passed - Patient is age 18 or older        Passed - No active pregnancy on record        Passed - No positive pregnancy test in last 12 months             Dorothy Michele RN 09/16/22 6:13 PM  "

## 2022-09-19 RX ORDER — PAROXETINE 20 MG/1
30 TABLET, FILM COATED ORAL EVERY MORNING
Qty: 90 TABLET | Refills: 2 | Status: SHIPPED | OUTPATIENT
Start: 2022-09-19 | End: 2023-03-16

## 2022-09-19 NOTE — TELEPHONE ENCOUNTER
Medication Question or Refill    Contacts       Type Contact Phone/Fax    09/16/2022 11:54 AM CDT Interface (Incoming) [facility] [address] [place] [address] [address] [facility] [phone]          What medication are you calling about (include dose and sig)?: PARoxetine (PAXIL) 30 MG 1 QD    Controlled Substance Agreement on file:   CSA -- Patient Level:    CSA: None found at the patient level.       Who prescribed the medication?: Armando Lowe MD  PCP - General      Do you need a refill? Yes: Patient states  upped the dose to 30 mg. SHe will be out today. She wants 30 mg not 20 mg. Please resend new RX.    When did you use the medication last? today    Patient offered an appointment? No    Do you have any questions or concerns?  No    Preferred Pharmacy:   [facility] [address] [place] [address] [address]  [address] [facility] [address]  Phone: [phone] Fax: [phone]    Jenifer Sam on 9/19/2022 at 12:36 PM        Could we send this information to you in [facility] or would you prefer to receive a phone call?:   Patient would prefer a phone call   Okay to leave a detailed message?: Yes at Cell number on file:    Telephone Information:   Mobile [phone]

## 2022-09-25 ENCOUNTER — HEALTH MAINTENANCE LETTER (OUTPATIENT)
Age: 42
End: 2022-09-25

## 2022-10-27 ENCOUNTER — TRANSFERRED RECORDS (OUTPATIENT)
Dept: HEALTH INFORMATION MANAGEMENT | Facility: CLINIC | Age: 42
End: 2022-10-27

## 2023-01-30 ENCOUNTER — HEALTH MAINTENANCE LETTER (OUTPATIENT)
Age: 43
End: 2023-01-30

## 2023-03-15 DIAGNOSIS — F41.1 ANXIETY STATE: ICD-10-CM

## 2023-03-16 RX ORDER — PAROXETINE 20 MG/1
TABLET, FILM COATED ORAL
Qty: 135 TABLET | Refills: 0 | Status: SHIPPED | OUTPATIENT
Start: 2023-03-16 | End: 2023-05-17

## 2023-03-16 NOTE — TELEPHONE ENCOUNTER
"Last Written Prescription Date:  9/19/2022  Last Fill Quantity: 90,  # refills: 2   Last office visit provider:  7/28/2022     Requested Prescriptions   Pending Prescriptions Disp Refills     PARoxetine (PAXIL) 20 MG tablet [Pharmacy Med Name: PARoxetine HCl 20 MG Oral Tablet] 135 tablet 0     Sig: TAKE 1 & 1/2 (ONE & ONE-HALF) TABLETS BY MOUTH IN THE MORNING .  TOTAL  DAILY  DOSE  30MG       SSRIs Protocol Passed - 3/15/2023 10:54 AM        Passed - Recent (12 mo) or future (30 days) visit within the authorizing provider's specialty     Patient has had an office visit with the authorizing provider or a provider within the authorizing providers department within the previous 12 mos or has a future within next 30 days. See \"Patient Info\" tab in inbasket, or \"Choose Columns\" in Meds & Orders section of the refill encounter.              Passed - Medication is active on med list        Passed - Patient is age 18 or older        Passed - No active pregnancy on record        Passed - No positive pregnancy test in last 12 months             Aletha Wills RN 03/16/23 12:14 AM  "

## 2023-05-17 ENCOUNTER — MYC MEDICAL ADVICE (OUTPATIENT)
Dept: INTERNAL MEDICINE | Facility: CLINIC | Age: 43
End: 2023-05-17
Payer: COMMERCIAL

## 2023-05-17 DIAGNOSIS — F41.1 ANXIETY STATE: ICD-10-CM

## 2023-05-17 RX ORDER — PAROXETINE 20 MG/1
TABLET, FILM COATED ORAL
Qty: 135 TABLET | Refills: 3 | Status: SHIPPED | OUTPATIENT
Start: 2023-05-17 | End: 2023-08-02

## 2023-05-17 NOTE — TELEPHONE ENCOUNTER
Last office visit 7/26/22 for worsening anxiety and depression, paroxetine dose increased from 20mg to 30 mg/day at that time.     Last refilled 3/15/23 for 90d supply.    5/26 appointment with provider cancelled due to schedule change.    As provider will be leaving this clinic, routing directly to him to fill, opportunity for farewell message.

## 2023-07-06 ENCOUNTER — MYC MEDICAL ADVICE (OUTPATIENT)
Dept: INTERNAL MEDICINE | Facility: CLINIC | Age: 43
End: 2023-07-06
Payer: COMMERCIAL

## 2023-07-06 DIAGNOSIS — M25.511 RIGHT SHOULDER PAIN, UNSPECIFIED CHRONICITY: Primary | ICD-10-CM

## 2023-07-28 ASSESSMENT — ENCOUNTER SYMPTOMS
JOINT SWELLING: 0
DIZZINESS: 0
NERVOUS/ANXIOUS: 1
HEADACHES: 1
DYSURIA: 0
HEMATURIA: 0
FEVER: 0
SHORTNESS OF BREATH: 0
PARESTHESIAS: 0
FREQUENCY: 1
DIARRHEA: 0
SORE THROAT: 0
ABDOMINAL PAIN: 0
NAUSEA: 0
PALPITATIONS: 0
MYALGIAS: 1
HEMATOCHEZIA: 0
BREAST MASS: 0
ARTHRALGIAS: 1
EYE PAIN: 0
CHILLS: 0
CONSTIPATION: 0
WEAKNESS: 0
HEARTBURN: 0
COUGH: 0

## 2023-08-02 ENCOUNTER — OFFICE VISIT (OUTPATIENT)
Dept: FAMILY MEDICINE | Facility: CLINIC | Age: 43
End: 2023-08-02
Payer: COMMERCIAL

## 2023-08-02 ENCOUNTER — LAB (OUTPATIENT)
Dept: FAMILY MEDICINE | Facility: CLINIC | Age: 43
End: 2023-08-02

## 2023-08-02 VITALS
BODY MASS INDEX: 22.58 KG/M2 | RESPIRATION RATE: 14 BRPM | WEIGHT: 149 LBS | TEMPERATURE: 98.2 F | HEART RATE: 100 BPM | HEIGHT: 68 IN | SYSTOLIC BLOOD PRESSURE: 112 MMHG | DIASTOLIC BLOOD PRESSURE: 62 MMHG | OXYGEN SATURATION: 98 %

## 2023-08-02 DIAGNOSIS — Z00.00 ENCOUNTER FOR ROUTINE HISTORY AND PHYSICAL EXAMINATION OF ADULT: Primary | ICD-10-CM

## 2023-08-02 DIAGNOSIS — Z12.11 SCREEN FOR COLON CANCER: ICD-10-CM

## 2023-08-02 DIAGNOSIS — Z12.31 VISIT FOR SCREENING MAMMOGRAM: ICD-10-CM

## 2023-08-02 DIAGNOSIS — Z01.84 ANTIBODY RESPONSE EXAMINATION: ICD-10-CM

## 2023-08-02 DIAGNOSIS — F41.1 ANXIETY STATE: ICD-10-CM

## 2023-08-02 PROCEDURE — 99396 PREV VISIT EST AGE 40-64: CPT | Performed by: FAMILY MEDICINE

## 2023-08-02 PROCEDURE — 99213 OFFICE O/P EST LOW 20 MIN: CPT | Mod: 25 | Performed by: FAMILY MEDICINE

## 2023-08-02 RX ORDER — PAROXETINE 20 MG/1
TABLET, FILM COATED ORAL
Qty: 135 TABLET | Refills: 3 | Status: SHIPPED | OUTPATIENT
Start: 2023-08-02 | End: 2023-09-27

## 2023-08-02 RX ORDER — LORAZEPAM 1 MG/1
1 TABLET ORAL EVERY 6 HOURS PRN
Qty: 15 TABLET | Refills: 1 | Status: SHIPPED | OUTPATIENT
Start: 2023-08-02 | End: 2024-02-26

## 2023-08-02 ASSESSMENT — ENCOUNTER SYMPTOMS
HEMATURIA: 0
NERVOUS/ANXIOUS: 1
NAUSEA: 0
CONSTIPATION: 0
BREAST MASS: 0
ARTHRALGIAS: 1
WEAKNESS: 0
DIARRHEA: 0
CHILLS: 0
DYSURIA: 0
FEVER: 0
MYALGIAS: 1
HEARTBURN: 0
HEMATOCHEZIA: 0
SHORTNESS OF BREATH: 0
PARESTHESIAS: 0
HEADACHES: 1
SORE THROAT: 0
EYE PAIN: 0
JOINT SWELLING: 0
FREQUENCY: 1
COUGH: 0
DIZZINESS: 0
ABDOMINAL PAIN: 0
PALPITATIONS: 0

## 2023-08-02 NOTE — PROGRESS NOTES
SUBJECTIVE:   CC: Sangita is an 43 year old who presents for preventive health visit.       8/2/2023     1:53 PM   Additional Questions   Roomed by Ирина       Healthy Habits:     Getting at least 3 servings of Calcium per day:  Yes    Bi-annual eye exam:  Yes    Dental care twice a year:  Yes    Sleep apnea or symptoms of sleep apnea:  None    Diet:  Low salt, Low fat/cholesterol and Carbohydrate counting    Frequency of exercise:  1 day/week    Duration of exercise:  15-30 minutes    Taking medications regularly:  Yes    Medication side effects:  Not applicable    Additional concerns today:  No    She is here today for a physical. She is doing well generally.     She does think that the Paxil is working ok. She takes very rare Ativan, can take 1/4 at a time of her medication when she needs. She does like having it to be on the safe side. She does usually have the medication last a long time.     She has had depression since 15, was on prozac for 20 years, then went onto effexor due to anxiety. She hated this and got off of this in a week.  She then did oneohm. It's a dna test checking medications. This testing showed that paxil would elisha better choice. She did up the dose last year but most of the days she is feeling ok. She would rather not be on medications.     She is also on organifi. This is a supplement that you take around your period, and does note a difference in her mood with this.     She does have some cramping at times in her ovaries. She does get sciatic nerve pain as well at times. She did have a back injury in 2008. Had an adjustment and then was able to get pregnant fairly quickly. She does note that she had back pain continuously with an IUD.     She does see an OB, she is overdue for this.     Today's PHQ-2 Score:       8/2/2023     1:39 PM   PHQ-2 ( 1999 Pf[employer])   Q1: Little interest or pleasure in doing things 1   Q2: Feeling down, depressed or hopeless 1   PHQ-2 Score 2   Q1: Little  interest or pleasure in doing things Several days   Q2: Feeling down, depressed or hopeless Several days   PHQ-2 Score 2           Have you ever done Advance Care Planning? (For example, a Health Directive, POLST, or a discussion with a medical provider or your loved ones about your wishes): No, advance care planning information given to patient to review.  Patient plans to discuss their wishes with loved ones or provider.      Social History     Tobacco Use    Smoking status: Former    Smokeless tobacco: Never   Substance Use Topics    Alcohol use: Yes     Alcohol/week: 1.0 standard drink of alcohol             7/28/2023    12:14 PM   Alcohol Use   Prescreen: >3 drinks/day or >7 drinks/week? No     Reviewed orders with patient.  Reviewed health maintenance and updated orders accordingly - Yes      Breast Cancer Screening:    FHS-7:       10/5/2021     2:57 PM 7/28/2023    12:17 PM   Breast CA Risk Assessment (FHS-7)   Did any of your first-degree relatives have breast or ovarian cancer? No No   Did any of your relatives have bilateral breast cancer? No No   Did any man in your family have breast cancer? No No   Did any woman in your family have breast and ovarian cancer? No No   Did any woman in your family have breast cancer before age 50 y? No No   Do you have 2 or more relatives with breast and/or ovarian cancer? No No   Do you have 2 or more relatives with breast and/or bowel cancer? No No       Mammogram Screening: Recommended annual mammography  Pertinent mammograms are reviewed under the imaging tab.    History of abnormal Pap smear: NO - age 30-65 PAP every 5 years with negative HPV co-testing recommended      3/24/2015    12:00 AM   PAP / HPV   HPV_EXT - HISTORICAL See Scanned Report      Reviewed and updated as needed this visit by clinical staff   Tobacco  Allergies  Meds  Problems  Med Hx  Surg Hx  Fam Hx          Reviewed and updated as needed this visit by Provider   Tobacco  Allergies  Meds  " Problems  Med Hx  Surg Hx  Fam Hx             Review of Systems   Constitutional:  Negative for chills and fever.   HENT:  Negative for congestion, ear pain, hearing loss and sore throat.    Eyes:  Negative for pain and visual disturbance.   Respiratory:  Negative for cough and shortness of breath.    Cardiovascular:  Negative for chest pain, palpitations and peripheral edema.   Gastrointestinal:  Negative for abdominal pain, constipation, diarrhea, heartburn, hematochezia and nausea.   Breasts:  Negative for tenderness, breast mass and discharge.   Genitourinary:  Positive for frequency. Negative for dysuria, genital sores, hematuria, pelvic pain, urgency, vaginal bleeding and vaginal discharge.   Musculoskeletal:  Positive for arthralgias and myalgias. Negative for joint swelling.   Skin:  Negative for rash.   Neurological:  Positive for headaches. Negative for dizziness, weakness and paresthesias.   Psychiatric/Behavioral:  Negative for mood changes. The patient is nervous/anxious.         OBJECTIVE:   /62   Pulse 100   Temp 98.2  F (36.8  C)   Resp 14   Ht 1.721 m (5' 7.75\")   Wt 67.6 kg (149 lb)   LMP 07/07/2023 (Exact Date)   SpO2 98%   BMI 22.82 kg/m    Physical Exam  GENERAL: healthy, alert and no distress  EYES: Eyes grossly normal to inspection, PERRL and conjunctivae and sclerae normal  HENT: ear canals and TM's normal, nose and mouth without ulcers or lesions  NECK: no adenopathy, no asymmetry, masses, or scars and thyroid normal to palpation  RESP: lungs clear to auscultation - no rales, rhonchi or wheezes  CV: regular rate and rhythm, normal S1 S2, no S3 or S4, no murmur, click or rub, no peripheral edema and peripheral pulses strong  ABDOMEN: soft, nontender, no hepatosplenomegaly, no masses and bowel sounds normal  MS: no gross musculoskeletal defects noted, no edema  SKIN: no suspicious lesions or rashes  NEURO: Normal strength and tone, mentation intact and speech normal  PSYCH: " mentation appears normal, affect normal/bright      ASSESSMENT/PLAN:   Sangita was seen today for physical.    Diagnoses and all orders for this visit:    Encounter for routine history and physical examination of adult   Routine health maintenance discussion:  No smoking, limited alcohol (7 or less servings per week), 5 fruits/veg servings per day, 200 minutes of exercise per week.  Daily calcium/vitamin D guidelines, bone health, colon cancer screening beginning at age 50.  Accident avoidance, sun screen.   Sees ob for her breast exam and pelvic exam, patient will have her send these to me.     Screen for colon cancer  -     COLOGUARD(EXACT SCIENCES); Future  - Discussed risks and benefits, will start with cologuard.     Visit for screening mammogram  -     MA SCREENING DIGITAL BILAT - Future  (s+30); Future    Anxiety state  -     PARoxetine (PAXIL) 20 MG tablet; TAKE 1 & 1/2 (ONE & ONE-HALF) TABLETS BY MOUTH IN THE MORNING .  TOTAL  DAILY  DOSE  30MG  -     LORazepam (ATIVAN) 1 MG tablet; Take 1 tablet (1 mg) by mouth every 6 hours as needed for anxiety  - Doing quite well with her medications. She will consider luteal dosing with her medications, and if working well she can let me know and I can update her prescription. Ok to update lorazepam as well as needed.     Antibody response examination  -     Hepatitis B Surface Antibody; Future    Other orders  -     REVIEW OF HEALTH MAINTENANCE PROTOCOL ORDERS  -     PRIMARY CARE FOLLOW-UP SCHEDULING; Future        Patient has been advised of split billing requirements and indicates understanding: Yes      COUNSELING:  Reviewed preventive health counseling, as reflected in patient instructions       Regular exercise       Healthy diet/nutrition       Alcohol Use       Contraception       Colorectal Cancer Screening        She reports that she has quit smoking. She has never used smokeless tobacco.          Aletha Valero MD  [facility]

## 2023-08-07 ENCOUNTER — HOSPITAL ENCOUNTER (OUTPATIENT)
Dept: MAMMOGRAPHY | Facility: CLINIC | Age: 43
Discharge: HOME | End: 2023-08-07
Attending: FAMILY MEDICINE | Admitting: FAMILY MEDICINE
Payer: COMMERCIAL

## 2023-08-07 DIAGNOSIS — Z12.31 VISIT FOR SCREENING MAMMOGRAM: ICD-10-CM

## 2023-08-07 PROCEDURE — 77067 SCR MAMMO BI INCL CAD: CPT

## 2023-08-18 LAB — NONINV COLON CA DNA+OCC BLD SCRN STL QL: NEGATIVE

## 2023-09-01 ENCOUNTER — MYC MEDICAL ADVICE (OUTPATIENT)
Dept: FAMILY MEDICINE | Facility: CLINIC | Age: 43
End: 2023-09-01
Payer: COMMERCIAL

## 2023-09-01 DIAGNOSIS — F41.1 ANXIETY STATE: Primary | ICD-10-CM

## 2023-09-05 NOTE — TELEPHONE ENCOUNTER
Left message to call back for: pt  Information to relay to patient: Please help arrange a video visit

## 2023-09-05 NOTE — TELEPHONE ENCOUNTER
Scheduled.   Patient also plans to send information through [employer] regarding appointment.

## 2023-09-07 RX ORDER — BUPROPION HYDROCHLORIDE 150 MG/1
150 TABLET ORAL EVERY MORNING
Qty: 30 TABLET | Refills: 2 | Status: SHIPPED | OUTPATIENT
Start: 2023-09-07 | End: 2023-11-27

## 2023-09-08 RX ORDER — PAROXETINE 10 MG/1
10 TABLET, FILM COATED ORAL EVERY MORNING
Qty: 30 TABLET | Refills: 1 | Status: SHIPPED | OUTPATIENT
Start: 2023-09-08 | End: 2024-02-23

## 2023-10-25 ENCOUNTER — TELEPHONE (OUTPATIENT)
Dept: FAMILY MEDICINE | Facility: CLINIC | Age: 43
End: 2023-10-25

## 2023-10-25 NOTE — TELEPHONE ENCOUNTER
Prior Authorization Request  Who s requesting:  covermymeds  Pharmacy Name and Location: [facility]  Medication Name: Paxil 10mg/5ml suspension  Insurance Plan: [employer]  Insurance Member ID Number:  [id number]   CoverMyMeds Key: [id number]  Informed patient that prior authorizations can take up to 10 business days for response:   No  Okay to leave a detailed message:

## 2023-10-27 NOTE — TELEPHONE ENCOUNTER
Central Prior Authorization Team - Phone: [phone]     PA Initiation    Medication: PAXIL 10 MG/5ML PO SUSP  Insurance Company: [employer] - Phone [phone] Fax [phone]  Pharmacy Filling the Rx: [facility] [address] [facility] [address]  Filling Pharmacy Phone: [phone]  Filling Pharmacy Fax:    Start Date: 10/27/2023

## 2023-10-30 NOTE — TELEPHONE ENCOUNTER
Central Prior Authorization Team - Phone: [phone]     PRIOR AUTHORIZATION DENIED    Medication: PAXIL 10 MG/5ML PO SUSP  Insurance Company: [employer] - Phone [phone] Fax [phone]  Denial Date: 10/27/2023    Denial Rational:           Appeal Information:  If the provider would like to appeal, please provide a letter of medical necessity and route back to the team. Otherwise you can close the encounter. Thank you, Central PA Team    Patient Notified: Unfortunately, we cannot call the patient with denials because we do not know what next steps the MD will take nor can we give medical advice, please notify the patient of what they are to expect for the continuation of their therapy from the provider.

## 2023-11-27 DIAGNOSIS — F41.1 ANXIETY STATE: ICD-10-CM

## 2023-11-27 RX ORDER — BUPROPION HYDROCHLORIDE 150 MG/1
150 TABLET ORAL EVERY MORNING
Qty: 90 TABLET | Refills: 1 | Status: SHIPPED | OUTPATIENT
Start: 2023-11-27 | End: 2024-02-09

## 2024-01-25 DIAGNOSIS — F41.1 ANXIETY STATE: ICD-10-CM

## 2024-01-25 NOTE — TELEPHONE ENCOUNTER
Refill Request  Medication name: Pending Prescriptions:                       Disp   Refills    PARoxetine (PAXIL) 10 MG/5ML suspension   35 mL  1            Sig: Take 2 mLs (4 mg) by mouth every morning for 7           days, THEN 1.5 mLs (3 mg) every morning for 7           days, THEN 1 mL (2 mg) every morning for 7 days,           THEN 0.5 mLs (1 mg) every morning for 7 days. In           addition to 15 mg daily to wean down on dose    Requested Pharmacy:  [facility] [address]

## 2024-01-26 NOTE — TELEPHONE ENCOUNTER
Pt calling to check status as she is out of medication and would like refilled.    Please call once sent

## 2024-01-29 RX ORDER — PAROXETINE 10 MG/5ML
SUSPENSION ORAL
Qty: 35 ML | Refills: 1 | OUTPATIENT
Start: 2024-01-29 | End: 2024-02-25

## 2024-01-29 RX ORDER — PAROXETINE 10 MG/5ML
SUSPENSION ORAL
Qty: 35 ML | Refills: 1 | Status: SHIPPED | OUTPATIENT
Start: 2024-01-29 | End: 2024-03-14 | Stop reason: ALTCHOICE

## 2024-01-29 NOTE — TELEPHONE ENCOUNTER
"Please inform pt that this medication was ordered by Dr. Valero at 10/30/2023 and the insurance denied to cover it.     Dr. Valero will be in the office tomorrow to address it.     \"PRIOR AUTHORIZATION DENIED \"    Connie Nazario MD on 1/29/2024 at 9:56 AM;  "

## 2024-02-23 ENCOUNTER — VIRTUAL VISIT (OUTPATIENT)
Dept: FAMILY MEDICINE | Facility: CLINIC | Age: 44
End: 2024-02-23
Payer: COMMERCIAL

## 2024-02-23 DIAGNOSIS — F41.1 ANXIETY STATE: ICD-10-CM

## 2024-02-23 PROCEDURE — 99213 OFFICE O/P EST LOW 20 MIN: CPT | Mod: 95 | Performed by: FAMILY MEDICINE

## 2024-02-23 RX ORDER — PAROXETINE 10 MG/1
5 TABLET, FILM COATED ORAL EVERY MORNING
Qty: 15 TABLET | Refills: 1 | Status: SHIPPED | OUTPATIENT
Start: 2024-02-23 | End: 2024-03-14 | Stop reason: ALTCHOICE

## 2024-02-23 ASSESSMENT — ANXIETY QUESTIONNAIRES
3. WORRYING TOO MUCH ABOUT DIFFERENT THINGS: SEVERAL DAYS
7. FEELING AFRAID AS IF SOMETHING AWFUL MIGHT HAPPEN: SEVERAL DAYS
5. BEING SO RESTLESS THAT IT IS HARD TO SIT STILL: NOT AT ALL
IF YOU CHECKED OFF ANY PROBLEMS ON THIS QUESTIONNAIRE, HOW DIFFICULT HAVE THESE PROBLEMS MADE IT FOR YOU TO DO YOUR WORK, TAKE CARE OF THINGS AT HOME, OR GET ALONG WITH OTHER PEOPLE: SOMEWHAT DIFFICULT
8. IF YOU CHECKED OFF ANY PROBLEMS, HOW DIFFICULT HAVE THESE MADE IT FOR YOU TO DO YOUR WORK, TAKE CARE OF THINGS AT HOME, OR GET ALONG WITH OTHER PEOPLE?: SOMEWHAT DIFFICULT
1. FEELING NERVOUS, ANXIOUS, OR ON EDGE: SEVERAL DAYS
6. BECOMING EASILY ANNOYED OR IRRITABLE: SEVERAL DAYS
7. FEELING AFRAID AS IF SOMETHING AWFUL MIGHT HAPPEN: SEVERAL DAYS
2. NOT BEING ABLE TO STOP OR CONTROL WORRYING: SEVERAL DAYS
GAD7 TOTAL SCORE: 5
4. TROUBLE RELAXING: NOT AT ALL
GAD7 TOTAL SCORE: 5

## 2024-02-23 NOTE — PROGRESS NOTES
Sangita is a 43 year old who is being evaluated via a billable video visit.      How would you like to obtain your AVS? MyChart  If the video visit is dropped, the invitation should be resent by: Text to cell phone: [phone]  Will anyone else be joining your video visit? No        Assessment & Plan     Anxiety state  -Really is doing well with her wean off of the paroxetine.  She will trial going down to 5 mg orally daily now for the next 7 to 10 days and if that is going well consider going up to 300 mg orally daily on her Wellbutrin prior to further weaning off of her paroxetine.  In addition to this given the question for possible perimenopause we discussed doing a day 3 FSH to see if this was elevated which may indicate perimenopause.  This could be secondary to her family history where her sister just went through menopause at 56 but her mom's family almost a relatively early.  She would be interested in knowing this to see if that would help explain some of her emotional lability.  If doing well she can follow-up with me in 4 to 6 months, and she can reach out to me sooner for adjustment in either medication as needed.  - PARoxetine (PAXIL) 10 MG tablet  Dispense: 15 tablet; Refill: 1  - Follicle stimulating hormone                    Subjective   Sangita is a 43 year old, presenting for the following health issues:  Recheck Medication (Would like to discuss medication changes)        2/23/2024    12:44 PM   Additional Questions   Roomed by Ирина     History of Present Illness       Mental Health Follow-up:  Patient presents to follow-up on Depression & Anxiety.Patient's depression since last visit has been:  Medium  The patient is not having other symptoms associated with depression.  Patient's anxiety since last visit has been:  Worse  The patient is not having other symptoms associated with anxiety.  Any significant life events: health concerns  Patient is feeling anxious or having panic  attacks.  Patient has no concerns about alcohol or drug use.    She eats 2-3 servings of fruits and vegetables daily.She consumes 0 sweetened beverage(s) daily.She exercises with enough effort to increase her heart rate 20 to 29 minutes per day.  She exercises with enough effort to increase her heart rate 3 or less days per week.   She is taking medications regularly.       She has been doing ok for the most part.     She was having some ovary pain, period on 1/1, back pain on 1/16. She got steroids on 1/20 and went from 10 mg to 5 mg of paxil on 1/22. By 1/24 she was crying all the time, was anxious, was getting dizzy. Went back to 7.5 mg on 1/24 and started buspirone on 1/26.     Sh was snowed with this, went back to 2.5 mg on this. She got her period on 1/28 again.     Went back to 8.5 mg on 1/30.     Now, through the month, she is on 7mg of paxil on 2/7 and then 6 mg on 2/12. She was getting more irritable and more anxious with this. She is doing well with the suspension. Last week she was more irritable.     She has not had more ovarian pain this last time. She does want to avoid hormones if she can.     Her sister just went through menopause at 56, although her mother's side goes through menopause early.           Objective           Vitals:  No vitals were obtained today due to virtual visit.    Physical Exam   GENERAL: alert and no distress  EYES: Eyes grossly normal to inspection.  No discharge or erythema, or obvious scleral/conjunctival abnormalities.  RESP: No audible wheeze, cough, or visible cyanosis.    SKIN: Visible skin clear. No significant rash, abnormal pigmentation or lesions.  NEURO: Cranial nerves grossly intact.  Mentation and speech appropriate for age.  PSYCH: Appropriate affect, tone, and pace of words        Video-Visit Details    Type of service:  Video Visit     Originating Location (pt. Location): Home    Distant Location (provider location):  On-site  Platform used for Video Visit:  Jimbo  Signed Electronically by: Aletha Valero MD

## 2024-02-26 ENCOUNTER — MYC REFILL (OUTPATIENT)
Dept: FAMILY MEDICINE | Facility: CLINIC | Age: 44
End: 2024-02-26
Payer: COMMERCIAL

## 2024-02-26 DIAGNOSIS — F41.1 ANXIETY STATE: ICD-10-CM

## 2024-02-26 RX ORDER — LORAZEPAM 1 MG/1
1 TABLET ORAL EVERY 6 HOURS PRN
Qty: 15 TABLET | Refills: 1 | Status: SHIPPED | OUTPATIENT
Start: 2024-02-26 | End: 2024-04-17

## 2024-03-04 ENCOUNTER — TELEPHONE (OUTPATIENT)
Dept: FAMILY MEDICINE | Facility: CLINIC | Age: 44
End: 2024-03-04
Payer: COMMERCIAL

## 2024-03-04 NOTE — TELEPHONE ENCOUNTER
Looks like Dr. Valero sent her a prescription for bupropion  mg on 2/9/2024.  I suggest that she double her current 150 mg dosing to finish what she has and  the other prescription.  Make her a follow-up appointment please with Dr. Valero if she does not have 1 already.

## 2024-03-04 NOTE — TELEPHONE ENCOUNTER
Spoke to patient and relayed message from provider.  Patient verbalized understanding and agrees with plan.    EDNA KraftN RN  [facility]

## 2024-03-04 NOTE — TELEPHONE ENCOUNTER
S-(situation): Patient called requesting adjustment with her antidepressant/anxiety medications.  She is feeling a lot more anxious and is leaving on vacation on Wednesday so would like an adjustment or advice prior to that.     B-(background): Patient had been on Prozac for 23 years and then went off it 5 years ago and went on Paxil.    She and her PCP and pharmacist she was consulting with have been making a plan to wean off her Paxil.  She has been slowly weaning off Paxil since October and is currently taking 5 mg since 2/28/24.      She also currently takes 150 mg Wellbutrin. The plan had been for her to possibly increase her Wellbutrin to 300 mg as she weans off the Paxil, but she has not done this yet.    Note from office visit on 2/2324:  Anxiety state  -Really is doing well with her wean off of the paroxetine.  She will trial going down to 5 mg orally daily now for the next 7 to 10 days and if that is going well consider going up to 300 mg orally daily on her Wellbutrin prior to further weaning off of her paroxetine.     A-(assessment): Patient is crying and feeling very nervous.   She has a hard time falling asleep, is doing audio therapy.  She takes no caffeine, no alcohol.  She states that she noticed that she started feeling anxious a couple days after she went down to 5 mg Paxil daily.      She denies any suicidal thoughts and also denies thoughts of harming self or others.    She is wondering if she could start back on Prozac as she is weaning off her Paxil because she is afraid the Wellbutrin will not be enough.  She has not filled the 300 mg Wellbutrin rx yet.     R-(recommendations): Please advise on recommendations for her medications for her anxiety.    LILI Kraft RN  [facility]              A-T Advancement Flap Text: The defect edges were debeveled with a #15 scalpel blade.  Given the location of the defect, shape of the defect and the proximity to free margins an A-T advancement flap was deemed most appropriate.  Using a sterile surgical marker, an appropriate advancement flap was drawn incorporating the defect and placing the expected incisions within the relaxed skin tension lines where possible.    The area thus outlined was incised deep to adipose tissue with a #15 scalpel blade.  The skin margins were undermined to an appropriate distance in all directions utilizing iris scissors.

## 2024-03-13 ENCOUNTER — MYC MEDICAL ADVICE (OUTPATIENT)
Dept: FAMILY MEDICINE | Facility: CLINIC | Age: 44
End: 2024-03-13
Payer: COMMERCIAL

## 2024-03-13 DIAGNOSIS — F41.1 ANXIETY STATE: Primary | ICD-10-CM

## 2024-03-14 RX ORDER — FLUOXETINE 10 MG/1
10 CAPSULE ORAL DAILY
Qty: 30 CAPSULE | Refills: 3 | Status: SHIPPED | OUTPATIENT
Start: 2024-03-14 | End: 2024-04-01 | Stop reason: DRUGHIGH

## 2024-03-14 NOTE — TELEPHONE ENCOUNTER
Spoke to patient and she did read Dr. Valero's message and is good with trying what she suggests.  She will reach out with any questions or concerns.    EDNA KraftN RN  [facility]

## 2024-03-14 NOTE — TELEPHONE ENCOUNTER
S-(situation): Patient called back with other recommendations/thoughts.    B-(background): See My chart below.  Patient has been weaning off of her Wellbutrin.  She has no more 150 mg left after today. She is wondering if she could slowly wean off the Wellbutrin and start 100 mg Wellbutrin and try something else. She never took the 300 mg dose.     She also feels like she needs a selective serotonin reuptake inhibitor and is wondering if she could start Prozac.     She has not taken Buspar for quite some time and doesn't plan to.     A-(assessment): Patient denies any suicidal thoughts or thoughts to harm self or others.  She does state that she is beginning to have signs of greater depression and has feeling os hopelessness, lack of purpose.    R-(recommendations): Please advise on any medication rx or advice.       LILI Kraft RN  [facility]

## 2024-03-14 NOTE — TELEPHONE ENCOUNTER
Patient is calling and checking on the status of her medication Prozac and states she has not seen any prescription yet and wondering if one is being sent to pharmacy. Patient is also  states she is not weaning off Wellbutrin she is weaning off of Paxil. Please advise and call patient back with updates please and thank you.    Detail Level: None Venipuncture Paragraph: An alcohol pad was applied to the venipuncture site. Venipuncture was performed using a butterfly needle. Pressure and a bandaid was applied to the site. No complications were noted. Bill For Individual Tests Below?: no

## 2024-03-21 ENCOUNTER — TELEPHONE (OUTPATIENT)
Dept: FAMILY MEDICINE | Facility: CLINIC | Age: 44
End: 2024-03-21
Payer: COMMERCIAL

## 2024-03-21 NOTE — TELEPHONE ENCOUNTER
General Call    Contacts         Type Contact Phone/Fax    03/21/2024 01:34 PM CDT Phone (Incoming) Sangita Bowling (Self) [phone] (M)          Reason for Call:  increase in medication    What are your questions or concerns:  pt is having a touch day and would like to know if/when she can increase the wellbutrin?  Would like a call back today as she would like to increase today if she can.  Advised Dr Valero not in clinic today but could possibly be addressed by covering or may wait til tomorrow      Date of last appointment with provider: 2/23/24    Could we send this information to you in [employer] or would you prefer to receive a phone call?:   Patient would prefer a phone call   Okay to leave a detailed message?: Yes at Cell number on file:    Telephone Information:   Mobile [phone]

## 2024-03-21 NOTE — TELEPHONE ENCOUNTER
It appears that bupropion  mg was dispensed earlier this month to the patient per records.  I prefer not to act on this as the record shows this prescription was refilled as a 150 mg.  I do not know if she was told to use 450 mg, but this is maximum dosing.  I will leave this to Dr. Valero.

## 2024-03-22 NOTE — TELEPHONE ENCOUNTER
Spoke with patient. She would like to know if/when a dose increase of prozac would be discussed. She didn't remember the specific plans going forward with this medication.      Currently only taking fluoxetine 10mg daily. Lorazepam PRN.   Last dose of Wellbutrin 3/18/24. Today seems good. Yesterday was awful. 4 bad days this week. She did schedule with counselor. Working on scheduling psychiatry appointment. No poor side effects     What does the recent future look as far as dosing?

## 2024-03-22 NOTE — TELEPHONE ENCOUNTER
Please call patient and clarify what the question is.     Per last time, she wanted to stop the bupropion.

## 2024-03-22 NOTE — TELEPHONE ENCOUNTER
Thanks for clarifying!    We can certainly increase in about 2-3 weeks after being on the current dose if she feels like she needs it. It looks like it has been maybe about one week?

## 2024-04-01 ENCOUNTER — MYC MEDICAL ADVICE (OUTPATIENT)
Dept: PSYCHIATRY | Facility: CLINIC | Age: 44
End: 2024-04-01
Payer: COMMERCIAL

## 2024-04-01 ENCOUNTER — VIRTUAL VISIT (OUTPATIENT)
Dept: BEHAVIORAL HEALTH | Facility: CLINIC | Age: 44
End: 2024-04-01
Payer: COMMERCIAL

## 2024-04-01 ENCOUNTER — VIRTUAL VISIT (OUTPATIENT)
Dept: PSYCHIATRY | Facility: CLINIC | Age: 44
End: 2024-04-01
Payer: COMMERCIAL

## 2024-04-01 DIAGNOSIS — F41.1 ANXIETY STATE: ICD-10-CM

## 2024-04-01 DIAGNOSIS — F41.1 GENERALIZED ANXIETY DISORDER: Primary | ICD-10-CM

## 2024-04-01 DIAGNOSIS — F43.9 TRAUMA AND STRESSOR-RELATED DISORDER: ICD-10-CM

## 2024-04-01 PROCEDURE — 90791 PSYCH DIAGNOSTIC EVALUATION: CPT | Mod: 95 | Performed by: COUNSELOR

## 2024-04-01 PROCEDURE — 99204 OFFICE O/P NEW MOD 45 MIN: CPT | Mod: 95 | Performed by: PSYCHIATRY & NEUROLOGY

## 2024-04-01 RX ORDER — FLUOXETINE 20 MG/5ML
15 SOLUTION ORAL DAILY
Qty: 113 ML | Refills: 2 | Status: SHIPPED | OUTPATIENT
Start: 2024-04-01 | End: 2024-04-22 | Stop reason: DRUGHIGH

## 2024-04-01 ASSESSMENT — PATIENT HEALTH QUESTIONNAIRE - PHQ9
10. IF YOU CHECKED OFF ANY PROBLEMS, HOW DIFFICULT HAVE THESE PROBLEMS MADE IT FOR YOU TO DO YOUR WORK, TAKE CARE OF THINGS AT HOME, OR GET ALONG WITH OTHER PEOPLE: SOMEWHAT DIFFICULT
SUM OF ALL RESPONSES TO PHQ QUESTIONS 1-9: 6
10. IF YOU CHECKED OFF ANY PROBLEMS, HOW DIFFICULT HAVE THESE PROBLEMS MADE IT FOR YOU TO DO YOUR WORK, TAKE CARE OF THINGS AT HOME, OR GET ALONG WITH OTHER PEOPLE: SOMEWHAT DIFFICULT
SUM OF ALL RESPONSES TO PHQ QUESTIONS 1-9: 6

## 2024-04-01 ASSESSMENT — PAIN SCALES - GENERAL: PAINLEVEL: NO PAIN (0)

## 2024-04-01 ASSESSMENT — COLUMBIA-SUICIDE SEVERITY RATING SCALE - C-SSRS
TOTAL  NUMBER OF ABORTED OR SELF INTERRUPTED ATTEMPTS LIFETIME: NO
6. HAVE YOU EVER DONE ANYTHING, STARTED TO DO ANYTHING, OR PREPARED TO DO ANYTHING TO END YOUR LIFE?: NO
TOTAL  NUMBER OF INTERRUPTED ATTEMPTS LIFETIME: NO
2. HAVE YOU ACTUALLY HAD ANY THOUGHTS OF KILLING YOURSELF?: NO
ATTEMPT LIFETIME: NO
1. HAVE YOU WISHED YOU WERE DEAD OR WISHED YOU COULD GO TO SLEEP AND NOT WAKE UP?: NO

## 2024-04-01 NOTE — PATIENT INSTRUCTIONS
"Patient Education   Collaborative Care Psychiatry Service  What to Expect  Here's what to expect from your Collaborative Care Psychiatry Service (CCPS).   About CCPS  CCPS means 2 people work together to help you get better. You'll meet with a behavioral health clinician and a psychiatric doctor. A behavioral health clinician helps people with mental health problems by talking with them. A psychiatric doctor helps people by giving them medicine.  How it works  At every visit, you'll see the behavioral health clinician (BHC) first. They'll talk with you about how you're doing and teach you how to feel better.   Then you'll see the psychiatric doctor. This doctor can help you deal with troubling thoughts and feelings by giving you medicine. They'll make sure you know the plan for your care.   CCPS usually takes 3 to 6 visits. If you need more visits, we may have you start seeing a different psychiatric doctor for ongoing care.  If you have any questions or concerns, we'll be glad to talk with you.  About visits  Be open  At your visits, please talk openly about your problems. It may feel hard, but it's the best way for us to help you.  Cancelling visits  If you can't come to your visit, please call us right away at [phone]. If you don't cancel at least 24 hours (1 full day) before your visit, that's \"late cancellation.\"  Being late to visits  Being very late is the same as not showing up. You will be a \"no show\" if:  Your appointment starts with a BHC, and you're more than 15 minutes late for a 30-minute (half hour) visit. This will also cancel your appointment with the psychiatric doctor.  Your appointment is with a psychiatric doctor only, and you're more than 15 minutes late for a 30-minute (half hour) visit.  Your appointment is with a psychiatric doctor only, and you're more than 30 minutes late for a 60-minute (full hour) visit.  If you cancel late or don't show up 2 times within 6 months, we may end your " care.   Getting help between visits  If you need help between visits, you can call us Monday to Friday from 8 a.m. to 4:30 p.m. at [phone].  Emergency care  Call 911 or go to the nearest emergency department if your life or someone else's life is in danger.  Call 988 anytime to reach the national Suicide and Crisis hotline.  Medicine refills  To refill your medicine, call your pharmacy. You can also call [facility]'s Behavioral Access at [phone], Monday to Friday, 8 a.m. to 4:30 p.m. It can take 1 to 3 business days to get a refill.   Forms, letters, and tests  You may have papers to fill out, like FMLA, short-term disability, and workability. We can help you with these forms at your visits, but you must have an appointment. You may need more than 1 visit for this, to be in an intensive therapy program, or both.  Before we can give you medicine for ADHD, we may refer you to get tested for it or confirm it another way.  We may not be able to give you an emotional support animal letter.  We don't do mental health checks ordered by the court.   We don't do mental health testing, but we can refer you to get tested.   Thank you for choosing us for your care.  For informational purposes only. Not to replace the advice of your health care provider. Copyright   2022 [facility]. All rights reserved. Scripted [id number] - 12/22.

## 2024-04-01 NOTE — PROGRESS NOTES
Virtual Visit Details    Type of service:  Video Visit     Originating Location (pt. Location): Home    Distant Location (provider location):  On-site  Platform used for Video Visit: [facility] Psychiatry Intake      IDENTIFICATION   Name: Sangita Bowling   : 1980/43 year old      Sex:    @ female          Telemedicine Visit: The patient's condition can be safely assessed and treated via synchronous audio and visual telemedicine encounter.      Face to Face/patient Contact total time: 34 minutes  Pre Charting time: 9 minutes; Post charting time, communication and other activities: 9 minutes; Total time 52 minutes  8:23 AM -8:57 AM    CHIEF COMPLAINT   Source of Referral:    Primary Care Provider:   Aletha Valero     Consult for anxiety       HISTORY OF PRESENT ILLNESS   Mornings are the most difficult. Anticipating if day is going okay. Morning anxiety worse in AM. Depression worsened with lower paroxetine. Generalized anxiety. Usually anxiety is good later on. The last two months more difficult with med changes, steroid use for back.     Waking up early since med trial changes with bupropion, fluoxetine and paroxetine. Mind racing, heart fast. Sometimes takes a quarter tab and then another quarter tab (0.25 mg of 1 mg tab). Usage varies, attempts to not use. Essentially taking lorazepam daily. Takes 0.25 - 1 mg a day currently.         Vital Signs:   LMP 2024        No data to display                         The following assessments were completed by patient for this visit:  PROMIS 10-Global Health (only subscores and total score):       3/26/2024    11:44 AM   PROMIS-10 Scores Only   Global Mental Health Score 10    10   Global Physical Health Score 17    17   PROMIS TOTAL - SUBSCORES 27    27           2022    10:36 AM 2024     7:18 AM   PHQ   PHQ-9 Total Score 6 6    6   Q9: Thoughts of better off dead/self-harm past 2 weeks Not at all Not at all          2022     10:36 AM 2/23/2024    10:14 AM   DEWEY-7 SCORE   Total Score 4 (minimal anxiety) 5 (mild anxiety)   Total Score 4 5        REVIEW OF SYSTEMS:   Unremarkable    PAST PSYCHIATRIC HISTORY:   Depression from 15 and started on fluoxetine then. Has dealt with depression at times through life with hopelessness, lack of purposely.   At one point had a panic attack at the age of 23, financially things were difficult and stopped fluoxetine and had a panic attack; self treated at first with nyquil but then ultimately sertraline, lorazepam  One panic attack with new med trial/adjustment on wellbutrin and fluoxetine  Fluoxetine as teenager was a normalizing experience - had energy, not sleeping 4-5 hours in the afternoon.   Lost boyfriend at age of 20 in car accident; from this difficulty leaving kids  Sporadic use of lorazepam 1-2x/year from 0425-7140. Then all of a suddent in 2019 couldn't get on a plan - had a panic attack once a month; then became generalized for months on end.   Med Trials:  Sertraline - worsened depressive spectrum sx  Fluoxetine - most efficacious medication; did well at 20 mg - highest dose  Paroxetine - with bupropion had insomnia; last dose 3/15  venlafaxine  Bupropion - on days she took had a racing heart, restlessness, could not focus  Buspirone - snowed effect  Lorazepam- taken for years and more so with med changes - at the highest has taken 1 mg po bid and felt groggy and did for up to two months and then reduced  Self-Directed Violence: remote instance of nonsuicidal self directed violence; in early 2000s nonsuicidal self directed violence by taking aspirin and fortino rock; no suicide attempts      PAST MEDICAL HISTORY:   No past medical history on file.   has no past medical history on file.    Current medications include:   Current Outpatient Medications   Medication Sig    FLUoxetine (PROZAC) 10 MG capsule Take 1 capsule (10 mg) by mouth daily    L.acid/B.bifidum/B.animal/FOS (PROBIOTIC COMPLEX  ORAL) [L.ACID/B.BIFIDUM/B.ANIMAL/FOS (PROBIOTIC COMPLEX ORAL)] Take by mouth.    LORazepam (ATIVAN) 1 MG tablet Take 1 tablet (1 mg) by mouth every 6 hours as needed for anxiety    buPROPion (WELLBUTRIN XL) 150 MG 24 hr tablet Take 1 tablet (150 mg) by mouth every morning (Patient not taking: Reported on 4/1/2024)     No current facility-administered medications for this visit.         FAMILY HISTORY:   Anxiety and depression  Daughter with anxiety    SOCIAL HISTORY:    with two kids. Describes him as great but dwells on things when things are not going well. Health  and volunteer. [employer].     Substance Use History:  Alcohol: no hx psychosocial difficulties  Nicotine: smoked but  Recreational substances: remote cocaine use - used in 2002 - took fortino rock and aspiring together (did not want to die); used cocaine and marijuana with a  certain group in summer; then found a better situation with new boyfriend, doing well in college; used cocaine going in [place].     MENTAL STATUS EXAMINATION:   Appearance: Good attention to grooming and hygiene  Attitude: Cooperative  Eye Contact: Good  Gait and Station: Sitting  Psychomotor Behavior: Minimally activated  Oriented to: Grossly person place and time  Attention Span and Concentration: Grossly fair   Speech: Anxious tone  Language: English  Mood:  anxious  Affect: Constricted mildly  Associations:  no loose associations  Thought Process:  logical, linear and goal oriented  Thought Content: No evidence of delusions or suicidal or homicidal ideation plan or intent  Memory: Grossly intact  Fund of Knowledge: Good  Insight:  good  Judgment:  intact, adequate for safety  Impulse Control:  intact        DIAGNOSES:   Generalized Anxiety Disorder  Major depressive disorder, moderate, recurrent    ASSESSMENT:   Patient with predominant difficulty with generalized anxiety, history of depressive episodes him more earlier in life.  Some difficulty with recent stressors, med  changes.  Utilize higher dose of fluoxetine to facilitate remission of symptoms.    Today Sangita Bowling reports no suicidal ideations. In addition, she has notable risk factors for self-harm, including anxiety. However, risk is mitigated by commitment to family, Quaker beliefs, absence of past attempts, and history of seeking help when needed. Therefore, based on all available evidence including the factors cited above, she does not appear to be at imminent risk for self-harm, does not meet criteria for a 72-hr hold, and therefore remains appropriate for ongoing outpatient level of care.       PLAN:     Patient advised of consultative model. Patient will continue to be seen for ongoing consultation and stabilization.  Does not meet criteria for involuntary treatment or hospitalization  Fluoxetine 10 mg daily 3/17/2024 =>4/1/2415 mg liquid daily-Risks, benefits and alternatives discussed.  Patient provides verbal consent to treatment.  DC'd bupropion (racing heart, restlessness, could not focus), paroxetine  Return in 6 weeks      Administrative Billing:   Time spent with patient was greater than 50% of time and/or significant time was spent in counseling and coordination of care regarding above diagnoses and treatment plan. Pre charting time and post charting time/documentation/coordination are done on date of service.     Signed:   Porter Miller M.D.  [facility] Psychiatry Service    Disclaimer: This note consists of symbols derived from keyboarding, dictation and/or voice recognition software. As a result, there may be errors in the script that have gone undetected. Please consider this when interpreting information found in this chart.    episodes of care

## 2024-04-01 NOTE — NURSING NOTE
Is the patient currently in the state of [place]? YES    Visit mode:VIDEO    If the visit is dropped, the patient can be reconnected by: VIDEO VISIT: Send to e-mail at: [email or web address]    Will anyone else be joining the visit? NO  (If patient encounters technical issues they should call [phone])    How would you like to obtain your AVS? MyChart    Are changes needed to the allergy or medication list? Pt stated no changes to allergies and Pt stated no med changes    Reason for visit: Consult    Alix Malloy [facility]    Care team has reviewed attendance agreement with patient. Patient advised that two failed appointments within 6 months may lead to termination of current episode of care.

## 2024-04-01 NOTE — Clinical Note
Dr. Valero,  Thank you for the consult and care of the patient.  Titrating Prozac slightly to 15 mg using suspension.  Sincerely, Porter Miller M.D. Consultative Psychiatrist Program Medical Director, [facility]ative Care Psychiatry Service

## 2024-04-01 NOTE — PROGRESS NOTES
"    [facility] Psychiatry Services - [place]         PATIENT'S NAME: Sangita Bowling  PREFERRED NAME: Sangita  PRONOUNS:       MRN: [id number]  : 1980  ADDRESS: [address] [place] [id number]  ACCT. NUMBER:  [id number]  DATE OF SERVICE: 24  START TIME: 734am  END TIME: 807am  PREFERRED PHONE: [phone]  May we leave a program related message: Yes  EMERGENCY CONTACT: was obtained spouse.  SERVICE MODALITY:  Video Visit:      Provider verified identity through the following two step process.  Patient provided:  Patient  and Patient was verified at admission/transfer    Telemedicine Visit: The patient's condition can be safely assessed and treated via synchronous audio and visual telemedicine encounter.      Reason for Telemedicine Visit: Patient has requested telehealth visit    Originating Site (Patient Location): Patient's home    Distant Site (Provider Location): Provider Remote Setting- Home Office    Consent:  The patient/guardian has verbally consented to: the potential risks and benefits of telemedicine (video visit) versus in person care; bill my insurance or make self-payment for services provided; and responsibility for payment of non-covered services.     Patient would like the video invitation sent by:  My Chart    Mode of Communication:  Video Conference via Amwell    Distant Location (Provider):  Off-site    As the provider I attest to compliance with applicable laws and regulations related to telemedicine.    UNIVERSAL ADULT Mental Health DIAGNOSTIC ASSESSMENT    Identifying Information:  Patient is a 43 year old,   individual.  Patient was referred for an assessment by primary care provider.  Patient attended the session alone.    Chief Complaint:   The reason for seeking services at this time is: \"Generalized Anxiety Disorter/depression\".  The problem(s) began 22.    Patient has attempted to resolve these concerns in the past through " "meditations, self-help books, others experience with panic attacks, medications, therapy and supports.    Reason for CCPS: Been on anxiety meds since 15. On Prozac for 24 years. Had 1st panic attack living in [place].  says she has always had anxiety with things. In 2018 pt lost weight, was depressed, unmotivated and had body aches. Her  worked at [employer] in the Ed. Pt ate probiotics and natural components. Pt focuses on gut health. Jan to April things were going well.     Pt had a anniversary for 10 years with her  and couldn't go due to anxiety. Pt had a panic attack Jan 5th and turned into DEWEY until May. Pt started on Paxil and did this for 6 weeks. Pt was given resource of [employer]. Pt has used these tools. Pt did EMDR and things were well for a few years. There was an increase with Paxil and things got worse and then they lowered a bit and it did somewhat helped.   Pt did mind coaching. Pt read a lot of self awareness and mindset types of things. A year ago 2 mentors dropped off and pt felt like she lost her support of community.  Saw a [facility] therapist. Plans to meet with her again.   In 2023 pt planned to do many things, grow business and go to many events when pt has social anxiety.    To wean of Paxil did it low and slow. Thing went well until Jan and pt had many birthdays and family and friends she is in charge to plan and pt felt overwhelmed. Pt has a building disc and went to PT.   Pt tried Buspar which wasn't great.   \"Since the middle of of January my life hasn't been pleasant.\"  Been on Prozac for 17 days. She has been making a chart to track good/bad days.     Hope to: figure out medication, having morning heart rate and what is causing this, pt has high metabolism     Social/Family History:  Patient reported they grew up in other [place], [place].  They were raised by biological parents  .  Parents  / .  Patient reported that their childhood was \"mom is free " and laughs and super happy person and dad was strict and dealt with depression and they worked opposite schedules.Feel like it was happy and a lot of lonely times.  Patient described their current relationships with family of origin as dad passed away several years ago, talk with mom several times a day. Good relationship with brother and sister.  Sister is on Prozac. Mom's side has anxiety. Depression on dad's side not tx.     The patient describes their cultural background as .  Cultural influences and impact on patient's life structure, values, norms, and healthcare: Middle class, dad and mom worked opposite schedules, didn't get alone well..  Contextual influences on patient's health include: Contextual Factors: Individual Factors 43-year-old  female, works part-time from home, has 2 children, wakes up with her palpitations in the morning and anxiety, Family Factors patient's father passed away several years ago, patient is close with mom and has a good relationship with brother and sister, and Learning Environment Factors in fourth grade and eighth grade patient had extra help with math .   These factors will be addressed in the Preliminary Treatment plan. Patient identified their preferred language to be English. Patient reported they does not need the assistance of an  or other support involved in therapy.     Patient reported had no significant delays in developmental tasks.  Patient's highest education level was some college .  Patient identified the following learning problems: 4th grade had extra help with math and in 8th grade too Modifications will not be used to assist communication in therapy. Patient reports they are  able to understand written materials.    Patient reported the following relationship history  1 time.  Patient's current relationship status is  for 15 years.   Patient identified their sexual orientation as heterosexual.  Patient reported having  2 child(greg). Patient identified partner; mother; siblings; friends; therapist as part of their support system.  Patient identified the quality of these relationships as stable and meaningful .      Patient's current living/housing situation involves staying in own home/apartment.  The immediate members of family and household include Shay, 43,Spouse and they report that housing is stable.    Patient is currently employed part time.  Pt works a few hours a week from home and sets own schedule and volunteers at [employer]. Patient reports their finances are obtained through employment; spouse. Patient does not identify finances as a current stressor.      Patient reported that they have not been involved with the legal system.     Patient does not report being under probation/ parole/ jurisdiction. They are not under any current court jurisdiction.     Patient's Strengths and Limitations:  Patient identified the following strengths or resources that will help them succeed in treatment: commitment to health and well being, friends / good social support, family support, insight, intelligence, motivation, sense of humor, and work ethic. Things that may interfere with the patient's success in treatment include: none identified.     Assessments:  The following assessments were completed by patient for this visit:  PHQ9:       7/28/2022    10:36 AM 4/1/2024     7:18 AM   PHQ-9 SCORE   PHQ-9 Total Score MyChart 6 (Mild depression) 6 (Mild depression)   PHQ-9 Total Score 6 6    6     GAD2:       3/26/2024    11:42 AM   DEWEY-2   Feeling nervous, anxious, or on edge 1   Not being able to stop or control worrying 1   DEWEY-2 Total Score 2    2     GAD7:       7/28/2022    10:36 AM 2/23/2024    10:14 AM   DEWEY-7 SCORE   Total Score 4 (minimal anxiety) 5 (mild anxiety)   Total Score 4 5     CAGE-AID:       3/26/2024    11:44 AM   CAGE-AID Total Score   Total Score 0    0   Total Score MyChart 0 (A total score of 2 or greater  is considered clinically significant)     PROMIS 10-Global Health (only subscores and total score):       3/26/2024    11:44 AM   PROMIS-10 Scores Only   Global Mental Health Score 10    10   Global Physical Health Score 17    17   PROMIS TOTAL - SUBSCORES 27    27     [place] Suicide Severity Rating Scale (Lifetime/Recent)      4/1/2024    11:10 AM   [place] Suicide Severity Rating (Lifetime/Recent)   Q1 Wish to be Dead (Lifetime) N   Q2 Non-Specific Active Suicidal Thoughts (Lifetime) N   Actual Attempt (Lifetime) N   Has subject engaged in non-suicidal self-injurious behavior? (Lifetime) Y   Has subject engaged in non-suicidal self-injurious behavior? (Past 3 Months) N   Interrupted Attempts (Lifetime) N   Aborted or Self-Interrupted Attempt (Lifetime) N   Preparatory Acts or Behavior (Lifetime) N   Calculated C-SSRS Risk Score (Lifetime/Recent) No Risk Indicated       Personal and Family Medical History:  Patient does report a family history of mental health concerns.  Patient reports family history includes Alzheimer Disease in her father; Brain Cancer in her paternal grandmother; Cerebrovascular Disease in her father and paternal grandmother; Hyperlipidemia in her father; Hypertension in her father; Lung Cancer in an other family member..     Patient does report Mental Health Diagnosis and/or Treatment.  Patient reported the following previous diagnoses which include(s): an anxiety disorder; depression .  Patient reported symptoms began age 15, recently sx ramped up.  Patient has received mental health services in the past:  therapy; psychiatry .  Psychiatric Hospitalizations: none .    Patient denies a history of civil commitment.      Currently, patient is receiving other mental health services.  These include  Cognitive Behavoral Therapy  .       Patient has had a physical exam to rule out medical causes for current symptoms.  Date of last physical exam was within the past year. Client was encouraged to  follow up with PCP if symptoms were to develop. The patient has a [facility] Provider, who is named Aletha Valero. Patient reports the following current medical concerns: see pt's chart and Dr. Miller's note from CCPS team based visit.  Patient reports pain concerns including back pain.  Patient does not want help addressing pain concerns. PT for back pain and it has better. There are not significant appetite / nutritional concerns / weight changes.   Patient does not report a history of head injury / trauma / cognitive impairment.      Patient reports current meds as:   Outpatient Medications Marked as Taking for the 4/1/24 encounter (Virtual Visit) with Tabby Faye LICSW   Medication Sig    FLUoxetine (PROZAC) 20 MG/5ML solution Take 3.75 mLs (15 mg) by mouth daily    LORazepam (ATIVAN) 1 MG tablet Take 1 tablet (1 mg) by mouth every 6 hours as needed for anxiety       Medication Adherence:  Patient reports taking.      Patient Allergies:    Allergies   Allergen Reactions    Venlafaxine Unknown       Medical History:  No past medical history on file.      Current Mental Status Exam:   Appearance:  Appropriate    Eye Contact:  Good   Psychomotor:  Normal       Gait / station:  no problem  Attitude / Demeanor: Cooperative  Interested Friendly  Speech      Rate / Production: Normal/ Responsive      Volume:  Normal  volume      Language:  intact  Mood:   Anxious   Affect:   Appropriate    Thought Content: Clear   Thought Process: Coherent  Flight of Ideas  Logical       Associations: No loosening of associations  Insight:   Good   Judgment:  Intact   Orientation:  All  Attention/concentration: Good    Substance Use:   Patient did not report a family history of substance use concerns; see medical history section for details. Patient has not received chemical dependency treatment in the past.  Patient has not ever been to detox.      Patient is not currently receiving any chemical dependency  treatment.           Substance History of use Age of first use Date of last use     Pattern and duration of use (include amounts and frequency)   Alcohol used in the past   [age] REPORTS SUBSTANCE USE: N/A  Maybe a Truly a night maybe before   Cannabis   used in the past [age] REPORTS SUBSTANCE USE: N/A     Amphetamines   used in the past   1 REPORTS SUBSTANCE USE: N/A   Cocaine/crack    used in the past 1  REPORTS SUBSTANCE USE: N/A   Hallucinogens used in the past   89  REPORTS SUBSTANCE USE: N/A   Inhalants never used         REPORTS SUBSTANCE USE: N/A   Heroin never used         REPORTS SUBSTANCE USE: N/A   Other Opiates never used     REPORTS SUBSTANCE USE: N/A   Benzodiazepine   currently use 22 REPORTS SUBSTANCE USE: N/A   Barbiturates never used     REPORTS SUBSTANCE USE: N/A   Over the counter meds used in the past kid? Never abused, used for colds 17 REPORTS SUBSTANCE USE: N/A   Caffeine used in the past 4   REPORTS SUBSTANCE USE: N/A  Half caff and decaf and changing meds stayed away from it   Nicotine  used in the past 6 REPORTS SUBSTANCE USE: N/A   Other substances not listed above:  Identify:  never used     REPORTS SUBSTANCE USE: N/A     Patient reported the following problems as a result of their substance use: no problems, not applicable.    Substance Use: No symptoms    Based on the negative CAGE score and clinical interview there  are not indications of drug or alcohol abuse.    Significant Losses / Trauma / Abuse / Neglect Issues:   Patient did not serve in the .  There are indications or report of significant loss, trauma, abuse or neglect issues related to: Boyfriend  in a car accident.  Concerns for possible neglect are not present.     Safety Assessment:   Patient denies current homicidal ideation and behaviors.  Patient denies current self-injurious ideation and behaviors.    Patient denied risk behaviors associated  with substance use.   Patient denies any high risk behaviors associated with mental health symptoms.  Patient reports the following current concerns for their personal safety: None.  Patient reports there are firearms in the house.     yes, they are secured.     History of Safety Concerns:  Patient denied a history of homicidal ideation.     Patient denied a history of personal safety concerns.    Patient denied a history of assaultive behaviors.    Patient denied a history of sexual assault behaviors.     Patient denied a history of risk behaviors associated with substance use.  Patient denies any history of high risk behaviors associated with mental health symptoms.  Patient reports the following protective factors: forward or future oriented thinking; dedication to family or friends; safe and stable environment; regular sleep; effectively controls impulses; regular physical activity; sense of belonging; purpose; help seeking behaviors when distressed; abstinence from substances; adherence with prescribed medication; living with other people; daily obligations; effective problem solving skills; commitment to well being; sense of meaning; positive social skills; healthy fear of risky behaviors or pain; financial stability; strong sense of self worth or esteem; sense of personal control or determination; access to a variety of clinical interventions and pets    Risk Plan:  See Recommendations for Safety and Risk Management Plan    Review of Symptoms per patient report:   Depression: Feelings of hopelessness and Irritability, SIB- 2 days after turned age 20, nothing recent, SI- no in lifetime  Jenni:  No Symptoms  Psychosis: No Symptoms  Anxiety: Excessive worry, Nervousness, Social anxiety, Ruminations, Poor concentration, and Irritability, if laying in bed and dog barks loud, pt will jump, concentration with paxil and Wellbutrin caused this- went away once stopping the Wellbutrin   Panic:  Palpitations, Shortness  of breath, Tremors, Pacing, Tingling, Numbness, and Sense of impending doom-1 in the past, had 1 panic attack recently took Ativan started at 5am and by noon pt went back to bed    Post Traumatic Stress Disorder:  Experienced traumatic event boyfriend killed in car accident at age 18 and pt still holds on to, this put pt on a path of a fear of losing people and has worked with therapist 5 years ago, did EMDR    Eating Disorder: No Symptoms  ADD / ADHD:  Forgetful and Restlessness/fidgety in the am  Conduct Disorder: No symptoms  Autism Spectrum Disorder: No symptoms  Obsessive Compulsive Disorder: No Symptoms    Patient reports the following compulsive behaviors and treatment history:  none .      Daughter has separation anxiety    Diagnostic Criteria:   Generalized Anxiety Disorder  A. Excessive anxiety and worry about a number of events or activities (such as work or school performance).   B. The person finds it difficult to control the worry.  C. Select 3 or more symptoms (required for diagnosis). Only one item is required in children.   - Restlessness or feeling keyed up or on edge.    - Being easily fatigued.    - Difficulty concentrating or mind going blank.   D. The focus of the anxiety and worry is not confined to features of an Axis I disorder.  E. The anxiety, worry, or physical symptoms cause clinically significant distress or impairment in social, occupational, or other important areas of functioning.   F. The disturbance is not due to the direct physiological effects of a substance (e.g., a drug of abuse, a medication) or a general medical condition (e.g., hyperthyroidism) and does not occur exclusively during a Mood Disorder, a Psychotic Disorder, or a Pervasive Developmental Disorder. Unspecified Trauma- and Stressor-Related Disorder, Symptoms characteristic of a trauma and stressor related disorder that cause clinically significant distress or impairment in social, occupational, or other important  areas of functioning predominate but do not meet the full criteria for any of the disorders in the trauma and stressor related disorders diagnostic class.     Functional Status:  Patient reports the following functional impairments:  home life; management of the household and or completion of tasks; organization; self care; social interactions.     Nonprogrammatic care:  Patient is requesting basic services to address current mental health concerns.    Clinical Summary:  1. Psychosocial, Cultural and Contextual Factors:  43-year-old  female, works part-time from home, has 2 children, wakes up with her palpitations in the morning and anxiety.  2. Principal DSM5 Diagnoses  (Sustained by DSM5 Criteria Listed Above):   300.02 (F41.1) Generalized Anxiety Disorder  309.9 (F43.9) Unspecified Trauma and Stressor Related Disorder.  3. Other Diagnoses that is relevant to services:   back pain.  4. Provisional Diagnosis:  300.02 (F41.1) Generalized Anxiety Disorder  309.9 (F43.9) Unspecified Trauma and Stressor Related Disorder as evidenced by DEWEY 7, PHQ9 and clinical interview .  5. Prognosis: Expect Improvement and Relieve Acute Symptoms.  6. Likely consequences of symptoms if not treated: worsening MH.  7. Client strengths include:  creative, educated, employed, has a previous history of therapy, insightful, intelligent, motivated, open to learning, responsible parent, and support of family, friends and providers .     Recommendations:     1. Plan for Safety and Risk Management:   Safety and Risk: Recommended that patient call 911 or go to the local ED should there be a change in any of these risk factors..          Report to child / adult protection services was NA.     2. Patient's identified mental health concerns with a cultural influence will be addressed by CCPS staff .     3. Initial Treatment will focus on:    Anxiety - anxiety, social anxiety and panic .     4. Resources/Service Plan:    services  are not indicated.   Modifications to assist communication are not indicated.   Additional disability accommodations are not indicated.      5. Collaboration:   Collaboration / coordination of treatment will be initiated with the following  support professionals: Porter Miller M.D.      6.  Referrals:   The following referral(s) will be initiated:  TBD .       A Release of Information has been obtained for the following:  N/A .     Clinical Substantiation/medical necessity for the above recommendations:  Pt has a hx of anxiety and trauma symptoms that are impacting daily functioning in daily living and social settings. Through [facility] and [facility] checking on use of coping skills and therapy to help combat these symptoms may provide Pt with relief. Pt reports that they are struggling to manage anxiety and trauma symptoms and again CCPS model can assist with providing coping skills, following up that pt is using these skills, safety plan or other interventions along with medication to have the best impact to manage symptoms and provide relief. At this time pt's symptoms are able to be managed with OP services and pt will be referred to a higher level of care if there are abrupt changes in presentation or risk of harm.    7. TORY:    TORY:  Discussed the general effects of drugs and alcohol on health and well-being. Provider gave patient printed information about the effects of chemical use on their health and well being. Recommendations:  continue low to no use .     8. Records:   These were reviewed at time of assessment.   Information in this assessment was obtained from the medical record and  provided by patient who is a good historian.    Patient will have open access to their mental health medical record.    9.   Interactive Complexity: No    10. Safety Plan:     Provider Name/ Credentials:  JENISE Escalera, [facility]  April 1, 2024

## 2024-04-17 ENCOUNTER — MYC MEDICAL ADVICE (OUTPATIENT)
Dept: FAMILY MEDICINE | Facility: CLINIC | Age: 44
End: 2024-04-17

## 2024-04-17 ENCOUNTER — OFFICE VISIT (OUTPATIENT)
Dept: FAMILY MEDICINE | Facility: CLINIC | Age: 44
End: 2024-04-17
Payer: COMMERCIAL

## 2024-04-17 VITALS
RESPIRATION RATE: 18 BRPM | WEIGHT: 135 LBS | BODY MASS INDEX: 20.46 KG/M2 | SYSTOLIC BLOOD PRESSURE: 100 MMHG | DIASTOLIC BLOOD PRESSURE: 68 MMHG | HEIGHT: 68 IN | HEART RATE: 99 BPM | OXYGEN SATURATION: 98 % | TEMPERATURE: 98.1 F

## 2024-04-17 DIAGNOSIS — F41.1 ANXIETY STATE: Primary | ICD-10-CM

## 2024-04-17 DIAGNOSIS — Z01.84 ANTIBODY RESPONSE EXAMINATION: ICD-10-CM

## 2024-04-17 DIAGNOSIS — K52.9 GASTROENTERITIS: ICD-10-CM

## 2024-04-17 LAB
ERYTHROCYTE [DISTWIDTH] IN BLOOD BY AUTOMATED COUNT: 12 % (ref 10–15)
HCG SERPL QL: NEGATIVE
HCT VFR BLD AUTO: 41.6 % (ref 35–47)
HGB BLD-MCNC: 14.2 G/DL (ref 11.7–15.7)
MCH RBC QN AUTO: 31.1 PG (ref 26.5–33)
MCHC RBC AUTO-ENTMCNC: 34.1 G/DL (ref 31.5–36.5)
MCV RBC AUTO: 91 FL (ref 78–100)
PLATELET # BLD AUTO: 184 10E3/UL (ref 150–450)
RBC # BLD AUTO: 4.57 10E6/UL (ref 3.8–5.2)
WBC # BLD AUTO: 4.4 10E3/UL (ref 4–11)

## 2024-04-17 PROCEDURE — 99214 OFFICE O/P EST MOD 30 MIN: CPT | Performed by: FAMILY MEDICINE

## 2024-04-17 PROCEDURE — 84703 CHORIONIC GONADOTROPIN ASSAY: CPT | Performed by: FAMILY MEDICINE

## 2024-04-17 PROCEDURE — 86706 HEP B SURFACE ANTIBODY: CPT | Performed by: FAMILY MEDICINE

## 2024-04-17 PROCEDURE — 83690 ASSAY OF LIPASE: CPT | Performed by: FAMILY MEDICINE

## 2024-04-17 PROCEDURE — 84443 ASSAY THYROID STIM HORMONE: CPT | Performed by: FAMILY MEDICINE

## 2024-04-17 PROCEDURE — 85027 COMPLETE CBC AUTOMATED: CPT | Performed by: FAMILY MEDICINE

## 2024-04-17 PROCEDURE — 80053 COMPREHEN METABOLIC PANEL: CPT | Performed by: FAMILY MEDICINE

## 2024-04-17 PROCEDURE — 83001 ASSAY OF GONADOTROPIN (FSH): CPT | Performed by: FAMILY MEDICINE

## 2024-04-17 PROCEDURE — 36415 COLL VENOUS BLD VENIPUNCTURE: CPT | Performed by: FAMILY MEDICINE

## 2024-04-17 RX ORDER — LORAZEPAM 1 MG/1
1 TABLET ORAL EVERY 6 HOURS PRN
Qty: 60 TABLET | Refills: 1 | Status: SHIPPED | OUTPATIENT
Start: 2024-04-17

## 2024-04-17 RX ORDER — OMEPRAZOLE 20 MG/1
20 TABLET, DELAYED RELEASE ORAL DAILY
COMMUNITY

## 2024-04-17 ASSESSMENT — ANXIETY QUESTIONNAIRES
6. BECOMING EASILY ANNOYED OR IRRITABLE: SEVERAL DAYS
3. WORRYING TOO MUCH ABOUT DIFFERENT THINGS: MORE THAN HALF THE DAYS
7. FEELING AFRAID AS IF SOMETHING AWFUL MIGHT HAPPEN: SEVERAL DAYS
4. TROUBLE RELAXING: SEVERAL DAYS
8. IF YOU CHECKED OFF ANY PROBLEMS, HOW DIFFICULT HAVE THESE MADE IT FOR YOU TO DO YOUR WORK, TAKE CARE OF THINGS AT HOME, OR GET ALONG WITH OTHER PEOPLE?: VERY DIFFICULT
1. FEELING NERVOUS, ANXIOUS, OR ON EDGE: MORE THAN HALF THE DAYS
7. FEELING AFRAID AS IF SOMETHING AWFUL MIGHT HAPPEN: SEVERAL DAYS
GAD7 TOTAL SCORE: 10
5. BEING SO RESTLESS THAT IT IS HARD TO SIT STILL: SEVERAL DAYS
GAD7 TOTAL SCORE: 10
IF YOU CHECKED OFF ANY PROBLEMS ON THIS QUESTIONNAIRE, HOW DIFFICULT HAVE THESE PROBLEMS MADE IT FOR YOU TO DO YOUR WORK, TAKE CARE OF THINGS AT HOME, OR GET ALONG WITH OTHER PEOPLE: VERY DIFFICULT
2. NOT BEING ABLE TO STOP OR CONTROL WORRYING: MORE THAN HALF THE DAYS

## 2024-04-17 NOTE — PATIENT INSTRUCTIONS
Let's schedule the lorazepam 1 mg two times a day for now, about one week.If doing well with that, then we can go to .75 mg two times daily for a week or so. Then you could go down by 0.25 each week.     You can use the hydroxyzine for your as needed dose right now.     We'll check your labs today for now.     If needed, we could trial a very low dose of buspar, maybe 2.5 mg once a day or two times a day to see if this helps if needed.     You can do omeprazole for up to 6 weeks to help with your stomach. I suspect that you have some mild gastritis from your anxiety.     Lastly, we'll check labs to make sure that everything else looks good.

## 2024-04-17 NOTE — PROGRESS NOTES
Assessment & Plan     Anxiety state  -Seems to be starting to improve with the changes that were made and then she did have acute worsening secondary to what is likely gastroenteritis.  Will check labs listed as below, continue on current regimen of medications with slow wean off of lorazepam and using Vistaril as needed.  We discussed that if she was finding it difficult to wean off of the lorazepam we could consider a very low-dose of buspirone, may be 2.5 mg daily to see if this would help since she was sedated on higher doses.  - LORazepam (ATIVAN) 1 MG tablet  Dispense: 60 tablet; Refill: 1  - TSH with free T4 reflex  - Follicle stimulating hormone  - TSH with free T4 reflex    Gastroenteritis  -Will check basic labs as listed to ensure no obvious etiologies, okay to continue on the omeprazole for up to 6 weeks.  She will reach out if things or not improving.  - Comprehensive metabolic panel (BMP + Alb, Alk Phos, ALT, AST, Total. Bili, TP)  - CBC with platelets  - Lipase  - HCG qualitative, Blood ([id number])  - Comprehensive metabolic panel (BMP + Alb, Alk Phos, ALT, AST, Total. Bili, TP)  - CBC with platelets  - Lipase  - HCG qualitative, Blood ([id number])    Antibody response examination  -Unsure of hepatitis B status, since we are getting labs today will check antibody status.  - Hepatitis B Surface Antibody                    Desiree Quesada is a 43 year old, presenting for the following health issues:  Anxiety (Increase in anxiety) and Gastrointestinal Problem (Friday started having GI/chest pain, Diarrhea, bad breath, heartburn, no appetite, weak, dehydrated)      4/17/2024    10:26 AM   Additional Questions   Roomed by Ирина     History of Present Illness       Mental Health Follow-up:  Patient presents to follow-up on Depression & Anxiety.Patient's depression since last visit has been:  Medium  The patient is having other symptoms associated with depression.  Patient's anxiety since last visit has  been:  Worse  The patient is having other symptoms associated with anxiety.  Any significant life events: other  Patient is feeling anxious or having panic attacks.  Patient has no concerns about alcohol or drug use.    Reason for visit:  Anxiety symptoms stomach issues    She eats 0-1 servings of fruits and vegetables daily.She consumes 1 sweetened beverage(s) daily.She exercises with enough effort to increase her heart rate 9 or less minutes per day.  She exercises with enough effort to increase her heart rate 3 or less days per week.   She is taking medications regularly.       She is here today for worsening anxiety.     She does note that things were going well last week, she was excited to go to [employer] this week, was on the 15 mg of fluoxetine. She did get body aches, nausea so took some zofran. Got some diarrhea and then had some heartburn and chest pain. Did take some omeprazole with his.     She has had some diarrhea still. She hasn't had an appetite since going off the paroxetine. Has had cotton mouth since the fluoxetine as well. She does always lose weight with anxiety. She hasn't had a desire to cook food. When she got her virus she had worsening anxiety.     This weekend when her  got home from work she was having chest pain and her  was able to push on her belly, does note that her belly was ok, vitals were ok, no belly tenderness. The pain was on the right side, she did have a fever as well at that time, 100.8. Rested through the night, took a tylenol PM, seemed a bit better in the morning.     He did have her start some Imodium as well as some Omeprazole, started on Sunday. She does feel better now in the chest now, after 3 days of the medication. The diarrhea is less, but still fairly frequent. She does think that she still had double digit episdoes of diarrhea, but does seem to be slowing down. Was able to eat a hamburger.     She did feel lightheaded and weak this morning with  "shakiness. Was able to drink a gatorade and some saltines and does now feel better, after a shower as well.     With her heightened anxiety, she has been needing more of her lorazepam.     Buspar caused her to feel quite foggy even at 5 mg and then at 2.5 mg it wasn't helping.           Objective    /68   Pulse 99   Temp 98.1  F (36.7  C)   Resp 18   Ht 1.721 m (5' 7.75\")   Wt 61.2 kg (135 lb)   LMP 01/27/2024   SpO2 98%   BMI 20.68 kg/m    Body mass index is 20.68 kg/m .  Physical Exam   GENERAL: alert and no distress, thin  NECK: no adenopathy, no asymmetry, masses, or scars  RESP: lungs clear to auscultation - no rales, rhonchi or wheezes  CV: regular rate and rhythm, normal S1 S2, no S3 or S4, no murmur, click or rub, no peripheral edema  ABDOMEN: soft, nontender, no hepatosplenomegaly, no masses and bowel sounds normal  MS: no gross musculoskeletal defects noted, no edema  PSYCH: mentation appears normal, affect normal/bright, fatigued, judgement and insight intact, and appearance well groomed            Signed Electronically by: Aletha Valero MD    "

## 2024-04-17 NOTE — TELEPHONE ENCOUNTER
Spoke with patient. She reports the chest pain on Friday was sharp and radiated to back. Tums did provide some relief. She has been eating bland diet and taking omeprazole since Sunday - she reports this has helped with chest pain.      She reports she has not had an appetite for over a month and has lost a lot of weight over the last month. She also reports she has been in a constant state of anxiety.   So far this morning she has taking 1 mg of Ativan. She states she has been having to increase Ativan dosing and is still not feeling relief.     GI symptoms have slightly improved. Bowel movements are a little more formed but she is still experiencing diarrhea.     Patient looking for input from PCP and if she should be seen. Appointment scheduled for this morning with PCP since there was an opening, but patient is willing to cancel and continue to monitor at home if advised.     Dionne Louise RN  [facility]

## 2024-04-19 LAB
ALBUMIN SERPL BCG-MCNC: 4.5 G/DL (ref 3.5–5.2)
ALP SERPL-CCNC: 40 U/L (ref 40–150)
ALT SERPL W P-5'-P-CCNC: 18 U/L (ref 0–50)
ANION GAP SERPL CALCULATED.3IONS-SCNC: 10 MMOL/L (ref 7–15)
AST SERPL W P-5'-P-CCNC: 25 U/L (ref 0–45)
BILIRUB SERPL-MCNC: 0.2 MG/DL
BUN SERPL-MCNC: 7.3 MG/DL (ref 6–20)
CALCIUM SERPL-MCNC: 9.6 MG/DL (ref 8.6–10)
CHLORIDE SERPL-SCNC: 106 MMOL/L (ref 98–107)
CREAT SERPL-MCNC: 0.82 MG/DL (ref 0.51–0.95)
DEPRECATED HCO3 PLAS-SCNC: 24 MMOL/L (ref 22–29)
EGFRCR SERPLBLD CKD-EPI 2021: 89 ML/MIN/1.73M2
FSH SERPL IRP2-ACNC: 10.9 MIU/ML
GLUCOSE SERPL-MCNC: 116 MG/DL (ref 70–99)
HBV SURFACE AB SERPL IA-ACNC: 14.7 M[IU]/ML
HBV SURFACE AB SERPL IA-ACNC: REACTIVE M[IU]/ML
LIPASE SERPL-CCNC: 63 U/L (ref 13–60)
POTASSIUM SERPL-SCNC: 4 MMOL/L (ref 3.4–5.3)
PROT SERPL-MCNC: 6.9 G/DL (ref 6.4–8.3)
SODIUM SERPL-SCNC: 140 MMOL/L (ref 135–145)
TSH SERPL DL<=0.005 MIU/L-ACNC: 0.71 UIU/ML (ref 0.3–4.2)

## 2024-06-14 DIAGNOSIS — F41.1 ANXIETY STATE: ICD-10-CM

## 2024-06-14 RX ORDER — FLUOXETINE 10 MG/1
TABLET, FILM COATED ORAL
Qty: 15 TABLET | Refills: 3 | Status: SHIPPED | OUTPATIENT
Start: 2024-06-14

## 2024-06-14 NOTE — TELEPHONE ENCOUNTER
There isn't a 5 mg capsule or a 25 mg capsule. I sent in a 10 mg tablet that she can hopefully split in half.

## 2024-06-14 NOTE — TELEPHONE ENCOUNTER
Patient is taking 25mg of Prozac currently. Patient has some liquid left along with the capsules, but the liquid dosing does not allow her to accurately draw 5mg as it is 20mg/5ml.    Please advise if a new rx can be sent in for either 25mg capsules, or just 5mg capsules    Refill Request  Medication name: Pending Prescriptions:                       Disp   Refills    FLUoxetine (PROZAC) 20 MG capsule         30 cap*2            Sig: Take 1 capsule (20 mg) by mouth daily    Requested Pharmacy:  [facility] [address]

## 2024-06-30 DIAGNOSIS — F41.1 ANXIETY STATE: ICD-10-CM

## 2024-08-14 ENCOUNTER — HOSPITAL ENCOUNTER (OUTPATIENT)
Dept: MAMMOGRAPHY | Facility: CLINIC | Age: 44
Discharge: HOME | End: 2024-08-14
Attending: FAMILY MEDICINE | Admitting: FAMILY MEDICINE
Payer: COMMERCIAL

## 2024-08-14 DIAGNOSIS — Z12.31 VISIT FOR SCREENING MAMMOGRAM: ICD-10-CM

## 2024-08-14 PROCEDURE — 77063 BREAST TOMOSYNTHESIS BI: CPT

## 2024-09-09 ENCOUNTER — TRANSFERRED RECORDS (OUTPATIENT)
Dept: MULTI SPECIALTY CLINIC | Facility: CLINIC | Age: 44
End: 2024-09-09

## 2024-09-09 LAB — PAP SMEAR - HIM PATIENT REPORTED: NORMAL

## 2024-09-17 ENCOUNTER — TELEPHONE (OUTPATIENT)
Dept: FAMILY MEDICINE | Facility: CLINIC | Age: 44
End: 2024-09-17
Payer: COMMERCIAL

## 2024-09-17 DIAGNOSIS — F41.1 GENERALIZED ANXIETY DISORDER: ICD-10-CM

## 2024-09-17 RX ORDER — FLUOXETINE 20 MG/5ML
15 SOLUTION ORAL DAILY
Qty: 113 ML | Refills: 2 | Status: SHIPPED | OUTPATIENT
Start: 2024-09-17

## 2024-09-17 NOTE — TELEPHONE ENCOUNTER
New Medication Request        What medication are you requesting?:     FLUoxetine (PROZAC)  2MG LIQUID     Reason for medication request: No longer seeing initial prescriber, per [place] pt to notify when pt needs this filled in liquid form.    Have you taken this medication before?: Yes: prior prescriber    Controlled Substance Agreement on file:   CSA -- Patient Level:    CSA: None found at the patient level.         Patient offered an appointment? No    Preferred Pharmacy:   [facility] [address] [place] [address] [address] [place] [address] [facility]  [address] [place], [place] [address] [address]  Phone: [phone] Fax: [phone]      Could we send this information to you in [facility] or would you prefer to receive a phone call?:   Patient would prefer a phone call   Okay to leave a detailed message?: Yes at Home number on file [phone] (home)

## 2024-09-27 ENCOUNTER — TELEPHONE (OUTPATIENT)
Dept: FAMILY MEDICINE | Facility: CLINIC | Age: 44
End: 2024-09-27
Payer: COMMERCIAL

## 2024-09-27 DIAGNOSIS — M54.9 CHRONIC BACK PAIN, UNSPECIFIED BACK LOCATION, UNSPECIFIED BACK PAIN LATERALITY: Primary | ICD-10-CM

## 2024-09-27 DIAGNOSIS — G89.29 CHRONIC BACK PAIN, UNSPECIFIED BACK LOCATION, UNSPECIFIED BACK PAIN LATERALITY: Primary | ICD-10-CM

## 2024-09-27 NOTE — TELEPHONE ENCOUNTER
Order/Referral Request    Who is requesting: Patient     Orders being requested: Physical Therapy     Reason service is needed/diagnosis: Slip disc in her back     When are orders needed by: As soon as possible     Has this been discussed with Provider: Yes    Does patient have a preference on a Group/Provider/Facility? [facility]     Does patient have an appointment scheduled?: No    Where to send orders: Fax [phone] Attn: Felicia or Slade    Could we send this information to you in [facility] or would you prefer to receive a phone call?:   Patient would prefer a phone call   Okay to leave a detailed message?: Yes at Cell number on file:    Telephone Information:   Mobile [phone]

## 2024-09-28 ENCOUNTER — HEALTH MAINTENANCE LETTER (OUTPATIENT)
Age: 44
End: 2024-09-28

## 2025-01-09 DIAGNOSIS — F41.1 ANXIETY STATE: ICD-10-CM

## 2025-01-09 NOTE — TELEPHONE ENCOUNTER
Medication Question or Refill    Contacts       Contact Date/Time Type Contact Phone/Fax    01/09/2025 11:24 AM CST Phone (Incoming) Snagita Bowling (Self) [phone] (M)            What medication are you calling about (include dose and sig)?: Fluoxetine 20MG    Preferred Pharmacy:   [facility] [address] [place] [address] [address] [facility]  [address] [facility] [address]  [facility] [address]  Phone: [phone] Fax: [phone]      Controlled Substance Agreement on file:   CSA -- Patient Level:    CSA: None found at the patient level.       Who prescribed the medication?: Valero    Do you need a refill? Yes    When did you use the medication last? daily    Patient offered an appointment? No    Do you have any questions or concerns?  No      Could we send this information to you in [facility] or would you prefer to receive a phone call?:   Patient would prefer a phone call   Okay to leave a detailed message?: Yes at Cell number on file:    Telephone Information:   Mobile [phone]

## 2025-03-07 ENCOUNTER — TELEPHONE (OUTPATIENT)
Dept: FAMILY MEDICINE | Facility: CLINIC | Age: 45
End: 2025-03-07
Payer: COMMERCIAL

## 2025-03-07 DIAGNOSIS — F41.1 GENERALIZED ANXIETY DISORDER: ICD-10-CM

## 2025-03-07 RX ORDER — FLUOXETINE 20 MG/5ML
8 SOLUTION ORAL DAILY
Qty: 180 ML | Refills: 1 | Status: SHIPPED | OUTPATIENT
Start: 2025-03-07

## 2025-03-07 NOTE — TELEPHONE ENCOUNTER
Medication Question or Refill    Contacts       Contact Date/Time Type Contact Phone/Fax    03/07/2025 09:15 AM CST Phone (Incoming) Sangita Bowling (Self) [phone] (M)            What medication are you calling about (include dose and sig)?:   FLUoxetine (PROZAC) 20 MG/5ML solution 113 mL 2 9/17/2024 -- No   Sig - Route: Take 3.75 mLs (15 mg) by mouth daily. - Oral       Preferred Pharmacy:  [facility] [address] [facility]  [address] [facility] [address]  Phone: [phone] Fax: [phone]      Controlled Substance Agreement on file:   CSA -- Patient Level:    CSA: None found at the patient level.       Who prescribed the medication?: PCP    Do you need a refill? Yes    When did you use the medication last? 03/07/2025    Patient offered an appointment? No    Do you have any questions or concerns?  Yes: Pt needs liquid med refilled but it's not entered correctly to pend it to the call.  The Pt actually takes 2mg of the liquid (.5ml ), not the 3.75 (15mg).  Please correct RX and send it over for a refill.  TC pended pharmacy to the call.       Could we send this information to you in [facility] or would you prefer to receive a phone call?:   Patient would prefer a phone call   Okay to leave a detailed message?: Yes at Cell number on file:    Telephone Information:   Mobile [phone]      Primitivo Hernandez

## 2025-03-07 NOTE — TELEPHONE ENCOUNTER
8 mg/day  refill sent to the pharmacy for her. I agree, medication check or physical to be scheduled when able. Thanks

## 2025-03-07 NOTE — TELEPHONE ENCOUNTER
Please review patient medication refill request. Medication pended as it was last prescribed without refills as the patient was last seen in person 4/17/24 and is due for a yearly physical. The patient states she only takes 2 mL (8 mg) of the medication as opposed to the 3.75 mL (15 mg) she is prescribed. Please review and make any appropriate changes. Please route back so the patient can be updated and scheduled for a physical.    Darrell Bryant RN  [facility]

## 2025-03-10 NOTE — TELEPHONE ENCOUNTER
Spoke to patient and relayed provider message. Patient verbalized understanding and is in agreement with plan.     Physical scheduled for 6/3/25.     Madeleine Winter RN  [facility]

## 2025-05-31 SDOH — HEALTH STABILITY: PHYSICAL HEALTH: ON AVERAGE, HOW MANY DAYS PER WEEK DO YOU ENGAGE IN MODERATE TO STRENUOUS EXERCISE (LIKE A BRISK WALK)?: 6 DAYS

## 2025-05-31 SDOH — HEALTH STABILITY: PHYSICAL HEALTH: ON AVERAGE, HOW MANY MINUTES DO YOU ENGAGE IN EXERCISE AT THIS LEVEL?: 30 MIN

## 2025-05-31 ASSESSMENT — SOCIAL DETERMINANTS OF HEALTH (SDOH): HOW OFTEN DO YOU GET TOGETHER WITH FRIENDS OR RELATIVES?: ONCE A WEEK

## 2025-06-03 ENCOUNTER — OFFICE VISIT (OUTPATIENT)
Dept: FAMILY MEDICINE | Facility: CLINIC | Age: 45
End: 2025-06-03
Payer: COMMERCIAL

## 2025-06-03 VITALS
WEIGHT: 148 LBS | SYSTOLIC BLOOD PRESSURE: 114 MMHG | OXYGEN SATURATION: 99 % | DIASTOLIC BLOOD PRESSURE: 68 MMHG | BODY MASS INDEX: 22.43 KG/M2 | HEIGHT: 68 IN | RESPIRATION RATE: 16 BRPM | HEART RATE: 76 BPM

## 2025-06-03 DIAGNOSIS — F41.1 GENERALIZED ANXIETY DISORDER: ICD-10-CM

## 2025-06-03 DIAGNOSIS — Z23 IMMUNIZATION DUE: ICD-10-CM

## 2025-06-03 DIAGNOSIS — Z00.00 ENCOUNTER FOR ROUTINE HISTORY AND PHYSICAL EXAMINATION OF ADULT: Primary | ICD-10-CM

## 2025-06-03 PROCEDURE — 99396 PREV VISIT EST AGE 40-64: CPT | Mod: 25 | Performed by: FAMILY MEDICINE

## 2025-06-03 PROCEDURE — 90471 IMMUNIZATION ADMIN: CPT | Performed by: FAMILY MEDICINE

## 2025-06-03 PROCEDURE — 90715 TDAP VACCINE 7 YRS/> IM: CPT | Performed by: FAMILY MEDICINE

## 2025-06-03 RX ORDER — FLUOXETINE 20 MG/5ML
2 SOLUTION ORAL DAILY
Qty: 45 ML | Refills: 3 | Status: SHIPPED | OUTPATIENT
Start: 2025-06-03

## 2025-06-03 RX ORDER — CEPHALEXIN 500 MG/1
500 CAPSULE ORAL 4 TIMES DAILY
COMMUNITY
Start: 2025-05-23

## 2025-06-03 SDOH — HEALTH STABILITY: PHYSICAL HEALTH: ON AVERAGE, HOW MANY DAYS PER WEEK DO YOU ENGAGE IN MODERATE TO STRENUOUS EXERCISE (LIKE A BRISK WALK)?: 6 DAYS

## 2025-06-03 SDOH — HEALTH STABILITY: PHYSICAL HEALTH: ON AVERAGE, HOW MANY MINUTES DO YOU ENGAGE IN EXERCISE AT THIS LEVEL?: 30 MIN

## 2025-06-03 ASSESSMENT — SOCIAL DETERMINANTS OF HEALTH (SDOH): HOW OFTEN DO YOU GET TOGETHER WITH FRIENDS OR RELATIVES?: ONCE A WEEK

## 2025-06-03 ASSESSMENT — PAIN SCALES - GENERAL: PAINLEVEL_OUTOF10: NO PAIN (0)

## 2025-06-03 NOTE — PROGRESS NOTES
Preventive Care Visit  [facility]  Aletha Valero MD, Family Medicine  Edis 3, 2025      Assessment & Plan     Encounter for routine history and physical examination of adult  Routine health maintenance discussion:  No smoking, limited alcohol (7 or less servings per week), 5 fruits/veg servings per day, 200 minutes of exercise per week.  Daily calcium/vitamin D guidelines, bone health, colon cancer screening beginning at age 50.  Accident avoidance, sun screen.  -Follows with OB for her pelvic and breast exams, RIRI filled out today.     Generalized anxiety disorder  -Doing well at this time. Plan on follow up in one year, no charge  - FLUoxetine (PROZAC) 20 MG capsule  Dispense: 90 capsule; Refill: 3  - FLUoxetine (PROZAC) 20 MG/5ML solution  Dispense: 45 mL; Refill: 3    Immunization due  - TDAP 10-64Y (ADACEL,BOOSTRIX)        Counseling  Appropriate preventive services were addressed with this patient via screening, questionnaire, or discussion as appropriate for fall prevention, nutrition, physical activity, Tobacco-use cessation, social engagement, weight loss and cognition.  Checklist reviewing preventive services available has been given to the patient.  Reviewed patient's diet, addressing concerns and/or questions.           Desiree Quesada is a 45 year old, presenting for the following:  Physical (Patient is here today for an annual exam, non-fasting if lab work is needed. )        6/3/2025     7:56 AM   Additional Questions   Roomed by Mini MOREAU CMA   Accompanied by N/A         6/3/2025     7:56 AM   Patient Reported Additional Medications   Patient reports taking the following new medications N/A        Via the Health Maintenance questionnaire, the patient has reported the following services have been completed , this information has been sent to the abstraction team.    HPI    She does note that she is doing well. Does not have concerns.       Advance Care  Planning    Patient states has Health Care Directive and will send to [employer].        6/3/2025   General Health   How would you rate your overall physical health? Good   Feel stress (tense, anxious, or unable to sleep) Only a little   (!) STRESS CONCERN      6/3/2025   Nutrition   Three or more servings of calcium each day? Yes   Diet: Regular (no restrictions)   How many servings of fruit and vegetables per day? (!) 2-3   How many sweetened beverages each day? 0-1         6/3/2025   Exercise   Days per week of moderate/strenous exercise 6 days   Average minutes spent exercising at this level 30 min         6/3/2025   Social Factors   Frequency of gathering with friends or relatives Once a week   Worry food won't last until get money to buy more No   Food not last or not have enough money for food? No   Do you have housing? (Housing is defined as stable permanent housing and does not include staying outside in a car, in a tent, in an abandoned building, in an overnight shelter, or couch-surfing.) No   Are you worried about losing your housing? No   Lack of transportation? No   Unable to get utilities (heat,electricity)? No   Want help with housing or utility concern? No   (!) HOUSING CONCERN PRESENT      6/3/2025   Dental   Dentist two times every year? Yes         Today's PHQ-2 Score:       6/3/2025     7:39 AM   PHQ-2 ( 1999 [employer])   Q1: Little interest or pleasure in doing things 0   Q2: Feeling down, depressed or hopeless 0   PHQ-2 Score 0    Q1: Little interest or pleasure in doing things Not at all   Q2: Feeling down, depressed or hopeless Not at all   PHQ-2 Score 0       Patient-reported           6/3/2025   Substance Use   Alcohol more than 3/day or more than 7/wk No   Do you use any other substances recreationally? No     Social History     Tobacco Use    Smoking status: Former     Passive exposure: Never    Smokeless tobacco: Never   Vaping Use    Vaping status: Never Used   Substance Use Topics  "   Alcohol use: Yes     Alcohol/week: 1.0 standard drink of alcohol    Drug use: No           8/14/2024   LAST FHS-7 RESULTS   1st degree relative breast or ovarian cancer No   Any relative bilateral breast cancer No   Any male have breast cancer No   Any ONE woman have BOTH breast AND ovarian cancer No   Any woman with breast cancer before 50yrs No   2 or more relatives with breast AND/OR ovarian cancer No   2 or more relatives with breast AND/OR bowel cancer No        Mammogram Screening - Mammogram every 1-2 years updated in Health Maintenance based on mutual decision making        6/3/2025   STI Screening   New sexual partner(s) since last STI/HIV test? No     History of abnormal Pap smear: No - age 30- 64 PAP with HPV every 5 years recommended        3/24/2015    12:00 AM   PAP / HPV   HPV_EXT - HISTORICAL See Scanned Report      ASCVD Risk   The 10-year ASCVD risk score (Vern CABRERA, et al., 2019) is: 0.5%    Values used to calculate the score:      Age: 45 years      Sex: Female      Is Non- : No      Diabetic: No      Tobacco smoker: No      Systolic Blood Pressure: 114 mmHg      Is BP treated: No      HDL Cholesterol: 54 mg/dL      Total Cholesterol: 148 mg/dL        6/3/2025   Contraception/Family Planning   Questions about contraception or family planning No        Reviewed and updated as needed this visit by Provider   Tobacco  Allergies  Meds  Problems  Med Hx  Surg Hx  Fam Hx                Review of Systems  Constitutional, HEENT, cardiovascular, pulmonary, gi and gu systems are negative, except as otherwise noted.     Objective    Exam  /68 (BP Location: Left arm, Patient Position: Sitting, Cuff Size: Adult Regular)   Pulse 76   Resp 16   Ht 1.727 m (5' 8\")   Wt 67.1 kg (148 lb)   LMP 05/16/2025 (Approximate)   SpO2 99%   Breastfeeding No   BMI 22.50 kg/m     Estimated body mass index is 22.5 kg/m  as calculated from the following:    Height as of " "this encounter: 1.727 m (5' 8\").    Weight as of this encounter: 67.1 kg (148 lb).    Physical Exam  GENERAL: alert and no distress  EYES: Eyes grossly normal to inspection, PERRL and conjunctivae and sclerae normal  HENT: ear canals and TM's normal, nose and mouth without ulcers or lesions  NECK: no adenopathy, no asymmetry, masses, or scars  RESP: lungs clear to auscultation - no rales, rhonchi or wheezes  CV: regular rate and rhythm, normal S1 S2, no S3 or S4, no murmur, click or rub, no peripheral edema  ABDOMEN: soft, nontender, no hepatosplenomegaly, no masses and bowel sounds normal  MS: no gross musculoskeletal defects noted, no edema  SKIN: no suspicious lesions or rashes  NEURO: Normal strength and tone, mentation intact and speech normal  PSYCH: mentation appears normal, affect normal/bright        Signed Electronically by: Aletha Valero MD    "

## 2025-07-21 ENCOUNTER — PATIENT OUTREACH (OUTPATIENT)
Dept: CARE COORDINATION | Facility: CLINIC | Age: 45
End: 2025-07-21
Payer: COMMERCIAL